# Patient Record
Sex: FEMALE | Race: WHITE | NOT HISPANIC OR LATINO
[De-identification: names, ages, dates, MRNs, and addresses within clinical notes are randomized per-mention and may not be internally consistent; named-entity substitution may affect disease eponyms.]

---

## 2017-02-01 ENCOUNTER — APPOINTMENT (OUTPATIENT)
Dept: ORTHOPEDIC SURGERY | Facility: CLINIC | Age: 72
End: 2017-02-01

## 2017-03-13 ENCOUNTER — RESULT REVIEW (OUTPATIENT)
Age: 72
End: 2017-03-13

## 2017-12-19 ENCOUNTER — APPOINTMENT (OUTPATIENT)
Dept: INTERNAL MEDICINE | Facility: CLINIC | Age: 72
End: 2017-12-19
Payer: COMMERCIAL

## 2017-12-19 ENCOUNTER — NON-APPOINTMENT (OUTPATIENT)
Age: 72
End: 2017-12-19

## 2017-12-19 VITALS — DIASTOLIC BLOOD PRESSURE: 68 MMHG | SYSTOLIC BLOOD PRESSURE: 122 MMHG

## 2017-12-19 DIAGNOSIS — Z23 ENCOUNTER FOR IMMUNIZATION: ICD-10-CM

## 2017-12-19 PROCEDURE — 90688 IIV4 VACCINE SPLT 0.5 ML IM: CPT

## 2017-12-19 PROCEDURE — 99397 PER PM REEVAL EST PAT 65+ YR: CPT | Mod: 25

## 2017-12-19 PROCEDURE — G0008: CPT

## 2017-12-19 PROCEDURE — 93000 ELECTROCARDIOGRAM COMPLETE: CPT

## 2017-12-28 LAB
25(OH)D3 SERPL-MCNC: 34.1 NG/ML
ALBUMIN SERPL ELPH-MCNC: 4.5 G/DL
ALP BLD-CCNC: 63 U/L
ALT SERPL-CCNC: 30 U/L
ANION GAP SERPL CALC-SCNC: 12 MMOL/L
AST SERPL-CCNC: 19 U/L
BASOPHILS # BLD AUTO: 0.04 K/UL
BASOPHILS NFR BLD AUTO: 0.5 %
BILIRUB SERPL-MCNC: 0.7 MG/DL
BUN SERPL-MCNC: 21 MG/DL
CALCIUM SERPL-MCNC: 10.3 MG/DL
CHLORIDE SERPL-SCNC: 103 MMOL/L
CHOLEST SERPL-MCNC: 181 MG/DL
CHOLEST/HDLC SERPL: 2.6 RATIO
CO2 SERPL-SCNC: 28 MMOL/L
CREAT SERPL-MCNC: 0.91 MG/DL
EOSINOPHIL # BLD AUTO: 0.25 K/UL
EOSINOPHIL NFR BLD AUTO: 3.1 %
GLUCOSE SERPL-MCNC: 98 MG/DL
HBA1C MFR BLD HPLC: 5.6 %
HCT VFR BLD CALC: 41.6 %
HDLC SERPL-MCNC: 70 MG/DL
HGB BLD-MCNC: 13.1 G/DL
IMM GRANULOCYTES NFR BLD AUTO: 0.1 %
LDLC SERPL CALC-MCNC: 94 MG/DL
LYMPHOCYTES # BLD AUTO: 2.12 K/UL
LYMPHOCYTES NFR BLD AUTO: 26 %
MAN DIFF?: NORMAL
MCHC RBC-ENTMCNC: 30.5 PG
MCHC RBC-ENTMCNC: 31.5 GM/DL
MCV RBC AUTO: 96.7 FL
MONOCYTES # BLD AUTO: 0.43 K/UL
MONOCYTES NFR BLD AUTO: 5.3 %
NEUTROPHILS # BLD AUTO: 5.31 K/UL
NEUTROPHILS NFR BLD AUTO: 65 %
PLATELET # BLD AUTO: 295 K/UL
POTASSIUM SERPL-SCNC: 4.4 MMOL/L
PROT SERPL-MCNC: 7.3 G/DL
RBC # BLD: 4.3 M/UL
RBC # FLD: 14.5 %
SODIUM SERPL-SCNC: 143 MMOL/L
T4 FREE SERPL-MCNC: 1.1 NG/DL
TRIGL SERPL-MCNC: 84 MG/DL
TSH SERPL-ACNC: 1.93 UIU/ML
WBC # FLD AUTO: 8.16 K/UL

## 2017-12-29 PROBLEM — Z23 NEED FOR INFLUENZA VACCINATION: Status: RESOLVED | Noted: 2017-12-19 | Resolved: 2017-12-29

## 2018-01-10 ENCOUNTER — RESULT REVIEW (OUTPATIENT)
Age: 73
End: 2018-01-10

## 2019-01-08 ENCOUNTER — NON-APPOINTMENT (OUTPATIENT)
Age: 74
End: 2019-01-08

## 2019-01-08 ENCOUNTER — APPOINTMENT (OUTPATIENT)
Dept: INTERNAL MEDICINE | Facility: CLINIC | Age: 74
End: 2019-01-08
Payer: COMMERCIAL

## 2019-01-08 VITALS
SYSTOLIC BLOOD PRESSURE: 128 MMHG | HEART RATE: 68 BPM | WEIGHT: 162 LBS | DIASTOLIC BLOOD PRESSURE: 70 MMHG | HEIGHT: 61 IN | BODY MASS INDEX: 30.58 KG/M2 | OXYGEN SATURATION: 97 %

## 2019-01-08 DIAGNOSIS — H26.9 UNSPECIFIED CATARACT: ICD-10-CM

## 2019-01-08 PROCEDURE — 93000 ELECTROCARDIOGRAM COMPLETE: CPT

## 2019-01-08 PROCEDURE — 99215 OFFICE O/P EST HI 40 MIN: CPT | Mod: 25

## 2019-01-08 NOTE — ASSESSMENT
[High Risk Surgery - Intraperitoneal, Intrathoracic or Supringuinal Vascular Procedures] : High Risk Surgery - Intraperitoneal, Intrathoracic or Supringuinal Vascular Procedures - No (0) [Ischemic Heart Disease] : Ischemic Heart Disease - No (0) [Congestive Heart Failure] : Congestive Heart Failure - No (0) [Prior Cerebrovascular Accident or TIA] : Prior Cerebrovascular Accident or TIA - No (0) [Creatinine >= 2mg/dL (1 Point)] : Creatinine >= 2mg/dL - No (0) [Insulin-dependent Diabetic (1 Point)] : Insulin-dependent Diabetic - No (0) [0] : 0 , RCRI Class: I, Risk of Post-Op Cardiac Complications: 0.4%, Procedure Risk: Low-Risk [Patient Optimized for Surgery] : Patient optimized for surgery [No Further Testing Recommended] : no further testing recommended [Continue medications as is] : Continue current medications

## 2019-01-08 NOTE — HISTORY OF PRESENT ILLNESS
[No Pertinent Cardiac History] : no history of aortic stenosis, atrial fibrillation, coronary artery disease, recent myocardial infarction, or implantable device/pacemaker [No Pertinent Pulmonary History] : no history of asthma, COPD, sleep apnea, or smoking [No Adverse Anesthesia Reaction] : no adverse anesthesia reaction in self or family member [(Patient denies any chest pain, claudication, dyspnea on exertion, orthopnea, palpitations or syncope)] : Patient denies any chest pain, claudication, dyspnea on exertion, orthopnea, palpitations or syncope [____ METs%] : [unfilled] METs% [Prior Anesthesia] : Prior anesthesia [Chronic Anticoagulation] : no chronic anticoagulation [Chronic Kidney Disease] : no chronic kidney disease [Diabetes] : no diabetes [FreeTextEntry1] : Left cataract [FreeTextEntry2] : 1/14/19 [FreeTextEntry3] : Dr. Jm Dow   [phone] [Fever] : no fever [Chills] : no chills [Runny Nose] : no runny nose [Earache] : no earache [Headache] : no headache [Sore Throat] : no sore throat [Cough] : no cough [Appetite] : no decrease in appetite [Nausea] : no nausea [Vomiting] : no vomiting [Abdominal Pain] : no abdominal pain [Diarrhea] : no diarrhea [Easy Bruising] : no easy bruising [Rash] : no rash [Dysuria] : no dysuria [Urinary Frequency] : no urinary frequency [Sleep Apnea] : no sleep apnea [Bleeding Disorder] : no bleeding disorder [Impaired Immunity] : no impaired immunity [Frequent use of NSAIDs] : no use of NSAIDs

## 2019-01-09 LAB
25(OH)D3 SERPL-MCNC: 27.6 NG/ML
ALBUMIN SERPL ELPH-MCNC: 4.4 G/DL
ALP BLD-CCNC: 65 U/L
ALT SERPL-CCNC: 25 U/L
ANION GAP SERPL CALC-SCNC: 11 MMOL/L
AST SERPL-CCNC: 24 U/L
BASOPHILS # BLD AUTO: 0.05 K/UL
BASOPHILS NFR BLD AUTO: 0.7 %
BILIRUB SERPL-MCNC: 0.6 MG/DL
BUN SERPL-MCNC: 12 MG/DL
CALCIUM SERPL-MCNC: 10.1 MG/DL
CHLORIDE SERPL-SCNC: 103 MMOL/L
CHOLEST SERPL-MCNC: 152 MG/DL
CHOLEST/HDLC SERPL: 3 RATIO
CO2 SERPL-SCNC: 27 MMOL/L
CREAT SERPL-MCNC: 0.91 MG/DL
EOSINOPHIL # BLD AUTO: 0.25 K/UL
EOSINOPHIL NFR BLD AUTO: 3.3 %
GLUCOSE SERPL-MCNC: 87 MG/DL
HBA1C MFR BLD HPLC: 5.6 %
HCT VFR BLD CALC: 43.6 %
HDLC SERPL-MCNC: 51 MG/DL
HGB BLD-MCNC: 13.8 G/DL
IMM GRANULOCYTES NFR BLD AUTO: 0 %
LDLC SERPL CALC-MCNC: 85 MG/DL
LYMPHOCYTES # BLD AUTO: 2.18 K/UL
LYMPHOCYTES NFR BLD AUTO: 28.7 %
MAN DIFF?: NORMAL
MCHC RBC-ENTMCNC: 29.4 PG
MCHC RBC-ENTMCNC: 31.7 GM/DL
MCV RBC AUTO: 93 FL
MONOCYTES # BLD AUTO: 0.45 K/UL
MONOCYTES NFR BLD AUTO: 5.9 %
NEUTROPHILS # BLD AUTO: 4.66 K/UL
NEUTROPHILS NFR BLD AUTO: 61.4 %
PLATELET # BLD AUTO: 368 K/UL
POTASSIUM SERPL-SCNC: 4.6 MMOL/L
PROT SERPL-MCNC: 7.6 G/DL
RBC # BLD: 4.69 M/UL
RBC # FLD: 14.1 %
SODIUM SERPL-SCNC: 141 MMOL/L
T4 FREE SERPL-MCNC: 1.1 NG/DL
TRIGL SERPL-MCNC: 82 MG/DL
TSH SERPL-ACNC: 2.34 UIU/ML
WBC # FLD AUTO: 7.59 K/UL

## 2019-01-17 ENCOUNTER — RESULT REVIEW (OUTPATIENT)
Age: 74
End: 2019-01-17

## 2019-03-13 ENCOUNTER — TRANSCRIPTION ENCOUNTER (OUTPATIENT)
Age: 74
End: 2019-03-13

## 2019-03-15 ENCOUNTER — APPOINTMENT (OUTPATIENT)
Dept: INTERNAL MEDICINE | Facility: CLINIC | Age: 74
End: 2019-03-15

## 2020-03-17 ENCOUNTER — APPOINTMENT (OUTPATIENT)
Dept: INTERNAL MEDICINE | Facility: CLINIC | Age: 75
End: 2020-03-17

## 2020-10-30 ENCOUNTER — NON-APPOINTMENT (OUTPATIENT)
Age: 75
End: 2020-10-30

## 2020-11-02 ENCOUNTER — APPOINTMENT (OUTPATIENT)
Dept: INTERNAL MEDICINE | Facility: CLINIC | Age: 75
End: 2020-11-02
Payer: COMMERCIAL

## 2020-11-02 ENCOUNTER — NON-APPOINTMENT (OUTPATIENT)
Age: 75
End: 2020-11-02

## 2020-11-02 VITALS
SYSTOLIC BLOOD PRESSURE: 138 MMHG | WEIGHT: 156 LBS | DIASTOLIC BLOOD PRESSURE: 70 MMHG | HEIGHT: 60 IN | BODY MASS INDEX: 30.63 KG/M2

## 2020-11-02 PROCEDURE — 99072 ADDL SUPL MATRL&STAF TM PHE: CPT

## 2020-11-02 PROCEDURE — 99397 PER PM REEVAL EST PAT 65+ YR: CPT | Mod: 25

## 2020-11-02 PROCEDURE — 93000 ELECTROCARDIOGRAM COMPLETE: CPT | Mod: 59

## 2020-11-02 PROCEDURE — G0444 DEPRESSION SCREEN ANNUAL: CPT | Mod: NC,59

## 2020-11-02 NOTE — HISTORY OF PRESENT ILLNESS
[de-identified] : 75-year-old female with a history of hyperlipidemia, prediabetes, osteoporosis off a bisphosphonate for a number of years and a sessile colonic polyp removed by laser in 2017 that was 4.5 cm here for comprehensive evaluation.  She has no specific complaints at this time and has been feeling well.  Had a bone density in October that revealed osteopenia that was a little worse than prior.  She is restarted vitamin D3 1000 units daily but is not taking any calcium supplements.\par \par She is wondering if she may have had Covid as she was in [place] in December and came back and developed a severe cough.  1 other member of her family was sick as well.

## 2020-11-02 NOTE — COUNSELING
[Fall prevention counseling provided] : Fall prevention counseling provided [No throw rugs] : No throw rugs [Good understanding] : Patient has a good understanding of disease, goals and obesity follow-up plan [None] : None

## 2020-11-02 NOTE — ASSESSMENT
[FreeTextEntry1] : 1.  General health maintenance -mammogram is up to date.  Follows with GYN and reports Pap up-to-date.  Had a bone density last month that showed osteopenia with slight increased risk of fracture for both hips.  Is currently taking vitamin D but no calcium.  Suggested she start taking 1 Tums daily.  Had a flu shot last month.  Recommended Shingrix at the pharmacy.\par 2.  History of large polyp removed by laser.  Had a follow-up colonoscopy 1 year later and it is approximately now 3-1/2 years after the initial removal.  She is due for a colonoscopy in this window and recommended that she schedule I with her gastroenterologist.\par 3.  Hyperlipidemia tolerating a statin.  We will check a lipid profile today.\par 4.  Prediabetes -no signs or symptoms of overt diabetes.  Will check a hemoglobin A1c.\par 5.  Will check Covid antibodies to rule out any exposure.\par 6.  Follow-up in 1 year or as needed.

## 2020-11-02 NOTE — HEALTH RISK ASSESSMENT
[Very Good] : ~his/her~  mood as very good [] : No [Yes] : Yes [No] : In the past 12 months have you used drugs other than those required for medical reasons? No [No falls in past year] : Patient reported no falls in the past year [0] : 2) Feeling down, depressed, or hopeless: Not at all (0) [de-identified] : Occasional [de-identified] : [place] in her neighborhood. [[id number]] : 0 [Patient reported mammogram was normal] : Patient reported mammogram was normal [Patient reported bone density results were abnormal] : Patient reported bone density results were abnormal [Patient reported colonoscopy was normal] : Patient reported colonoscopy was normal [Change in mental status noted] : No change in mental status noted [Language] : denies difficulty with language [Behavior] : denies difficulty with behavior [Learning/Retaining New Information] : denies difficulty learning/retaining new information [Handling Complex Tasks] : denies difficulty handling complex tasks [Reasoning] : denies difficulty with reasoning [Spatial Ability and Orientation] : denies difficulty with spatial ability and orientation [None] : None [With Significant Other] : lives with significant other [] :  [Fully functional (bathing, dressing, toileting, transferring, walking, feeding)] : Fully functional (bathing, dressing, toileting, transferring, walking, feeding) [Fully functional (using the telephone, shopping, preparing meals, housekeeping, doing laundry, using] : Fully functional and needs no help or supervision to perform IADLs (using the telephone, shopping, preparing meals, housekeeping, doing laundry, using transportation, managing medications and managing finances) [Reports changes in hearing] : Reports no changes in hearing [Reports changes in vision] : Reports no changes in vision [MammogramDate] : 10/20 [BoneDensityDate] : 10/20 [BoneDensityComments] : Osteopenia [ColonoscopyDate] : 04/17 [ColonoscopyComments] : 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

## 2020-11-02 NOTE — PHYSICAL EXAM
[No Acute Distress] : no acute distress [Well Nourished] : well nourished [Well Developed] : well developed [Well-Appearing] : well-appearing [Normal Sclera/Conjunctiva] : normal sclera/conjunctiva [PERRL] : pupils equal round and reactive to light [EOMI] : extraocular movements intact [Normal Outer Ear/Nose] : the outer ears and nose were normal in appearance [No JVD] : no jugular venous distention [No Lymphadenopathy] : no lymphadenopathy [Supple] : supple [Thyroid Normal, No Nodules] : the thyroid was normal and there were no nodules present [No Respiratory Distress] : no respiratory distress  [No Accessory Muscle Use] : no accessory muscle use [Clear to Auscultation] : lungs were clear to auscultation bilaterally [Normal Rate] : normal rate  [Regular Rhythm] : with a regular rhythm [Normal S1, S2] : normal S1 and S2 [No Murmur] : no murmur heard [No Carotid Bruits] : no carotid bruits [No Abdominal Bruit] : a ~M bruit was not heard ~T in the abdomen [No Varicosities] : no varicosities [Pedal Pulses Present] : the pedal pulses are present [No Edema] : there was no peripheral edema [No Palpable Aorta] : no palpable aorta [No Extremity Clubbing/Cyanosis] : no extremity clubbing/cyanosis [Normal Appearance] : normal in appearance [No Nipple Discharge] : no nipple discharge [Soft] : abdomen soft [Non Tender] : non-tender [Non-distended] : non-distended [No Masses] : no abdominal mass palpated [No HSM] : no HSM [Normal Bowel Sounds] : normal bowel sounds [Normal Posterior Cervical Nodes] : no posterior cervical lymphadenopathy [Normal Anterior Cervical Nodes] : no anterior cervical lymphadenopathy [No CVA Tenderness] : no CVA  tenderness [No Spinal Tenderness] : no spinal tenderness [No Joint Swelling] : no joint swelling [Grossly Normal Strength/Tone] : grossly normal strength/tone [No Rash] : no rash [Coordination Grossly Intact] : coordination grossly intact [No Focal Deficits] : no focal deficits [Normal Gait] : normal gait [Normal Affect] : the affect was normal [Normal Insight/Judgement] : insight and judgment were intact

## 2020-11-04 LAB
25(OH)D3 SERPL-MCNC: 32.3 NG/ML
ALBUMIN SERPL ELPH-MCNC: 4.7 G/DL
ALP BLD-CCNC: 66 U/L
ALT SERPL-CCNC: 24 U/L
ANION GAP SERPL CALC-SCNC: 10 MMOL/L
AST SERPL-CCNC: 21 U/L
BASOPHILS # BLD AUTO: 0.07 K/UL
BASOPHILS NFR BLD AUTO: 0.9 %
BILIRUB SERPL-MCNC: 0.8 MG/DL
BUN SERPL-MCNC: 14 MG/DL
CALCIUM SERPL-MCNC: 10.5 MG/DL
CHLORIDE SERPL-SCNC: 101 MMOL/L
CHOLEST SERPL-MCNC: 186 MG/DL
CO2 SERPL-SCNC: 30 MMOL/L
CREAT SERPL-MCNC: 0.94 MG/DL
EOSINOPHIL # BLD AUTO: 0.17 K/UL
EOSINOPHIL NFR BLD AUTO: 2.3 %
ESTIMATED AVERAGE GLUCOSE: 120 MG/DL
GLUCOSE SERPL-MCNC: 102 MG/DL
HBA1C MFR BLD HPLC: 5.8 %
HCT VFR BLD CALC: 46 %
HDLC SERPL-MCNC: 67 MG/DL
HGB BLD-MCNC: 14.6 G/DL
IMM GRANULOCYTES NFR BLD AUTO: 0 %
LDLC SERPL CALC-MCNC: 99 MG/DL
LYMPHOCYTES # BLD AUTO: 1.82 K/UL
LYMPHOCYTES NFR BLD AUTO: 24.2 %
MAN DIFF?: NORMAL
MCHC RBC-ENTMCNC: 30 PG
MCHC RBC-ENTMCNC: 31.7 GM/DL
MCV RBC AUTO: 94.5 FL
MONOCYTES # BLD AUTO: 0.44 K/UL
MONOCYTES NFR BLD AUTO: 5.9 %
NEUTROPHILS # BLD AUTO: 5.02 K/UL
NEUTROPHILS NFR BLD AUTO: 66.7 %
NONHDLC SERPL-MCNC: 120 MG/DL
PLATELET # BLD AUTO: 258 K/UL
POTASSIUM SERPL-SCNC: 5 MMOL/L
PROT SERPL-MCNC: 7.1 G/DL
RBC # BLD: 4.87 M/UL
RBC # FLD: 13.5 %
SODIUM SERPL-SCNC: 142 MMOL/L
T4 FREE SERPL-MCNC: 1.1 NG/DL
TRIGL SERPL-MCNC: 104 MG/DL
TSH SERPL-ACNC: 2.62 UIU/ML
WBC # FLD AUTO: 7.52 K/UL

## 2021-02-24 ENCOUNTER — RX RENEWAL (OUTPATIENT)
Age: 76
End: 2021-02-24

## 2021-06-22 ENCOUNTER — TRANSCRIPTION ENCOUNTER (OUTPATIENT)
Age: 76
End: 2021-06-22

## 2021-11-16 ENCOUNTER — NON-APPOINTMENT (OUTPATIENT)
Age: 76
End: 2021-11-16

## 2021-11-17 ENCOUNTER — NON-APPOINTMENT (OUTPATIENT)
Age: 76
End: 2021-11-17

## 2021-11-17 ENCOUNTER — APPOINTMENT (OUTPATIENT)
Dept: INTERNAL MEDICINE | Facility: CLINIC | Age: 76
End: 2021-11-17
Payer: COMMERCIAL

## 2021-11-17 VITALS
RESPIRATION RATE: 14 BRPM | HEIGHT: 60 IN | BODY MASS INDEX: 30.63 KG/M2 | SYSTOLIC BLOOD PRESSURE: 138 MMHG | WEIGHT: 156 LBS | DIASTOLIC BLOOD PRESSURE: 80 MMHG | HEART RATE: 72 BPM | OXYGEN SATURATION: 95 %

## 2021-11-17 DIAGNOSIS — Z01.818 ENCOUNTER FOR OTHER PREPROCEDURAL EXAMINATION: ICD-10-CM

## 2021-11-17 DIAGNOSIS — K63.5 POLYP OF COLON: ICD-10-CM

## 2021-11-17 DIAGNOSIS — E55.9 VITAMIN D DEFICIENCY, UNSPECIFIED: ICD-10-CM

## 2021-11-17 PROCEDURE — G0444 DEPRESSION SCREEN ANNUAL: CPT | Mod: NC,59

## 2021-11-17 PROCEDURE — 93000 ELECTROCARDIOGRAM COMPLETE: CPT | Mod: 59

## 2021-11-17 PROCEDURE — 99397 PER PM REEVAL EST PAT 65+ YR: CPT | Mod: 25

## 2021-11-17 NOTE — COUNSELING
[No throw rugs] : No throw rugs [Good understanding] : Patient has a good understanding of disease, goals and obesity follow-up plan [None] : None

## 2021-11-17 NOTE — ASSESSMENT
[FreeTextEntry1] : 1.  General health maintenance -mammogram is up to date.  Follows with GYN and reports Pap up-to-date.  Had a bone density last year that showed osteopenia with slight increased risk of fracture for both hips.   Had a flu shot in September and had her COVID series and booster.  Recommended Shingrix at the pharmacy.  Uses sunscreen, seatbelts etc.  Depression screening - a PHQ2 was administered to the patient and reviewed at the time of the visit.  The PHQ2 was negative and no further action is required.  Will rescreen in 1 year or prn.\par 2.  History of large colonic polyp removed by laser.  Had a follow-up colonoscopy 1 year later and one this past April which she reports was fine.  Repeat is due in 3 years.\par 3.  Hyperlipidemia tolerating a statin.  We will check a lipid profile today.\par 4.  Prediabetes -no signs or symptoms of overt diabetes.  Will check a hemoglobin A1c.\par 5.  Dupytrens contracture - continue with stretching exercises.\par 6.  RTO 1 year or prn.

## 2021-11-17 NOTE — HISTORY OF PRESENT ILLNESS
[de-identified] : 76-year-old female with a history of hyperlipidemia, prediabetes, osteoporosis off a bisphosphonate for a number of years and a sessile colonic polyp removed by laser in 2017 that was 4.5 cm here for comprehensive evaluation.  She has no specific complaints at this time and has been feeling well.  Had a bone density in October, 20 that revealed osteopenia that was a little worse than prior.  She is restarted vitamin D3 1000 units daily but is not taking any calcium supplements.\par \par She did have a trip but not a fall over a speed bump bruising her Achilles region which took a number of weeks to heal.  Had a colonoscopy in April to follow-up on her large sessile polyp that was removed in 2017 and had no polyps removed at this time.  Had her flu shot in September and her Covid booster in October.  Saw a hand surgeon regarding Dupuytren's contractures which he feels is not bad enough to warrant surgery at this time.

## 2021-11-17 NOTE — HEALTH RISK ASSESSMENT
[Very Good] : ~his/her~  mood as very good [Yes] : Yes [No] : In the past 12 months have you used drugs other than those required for medical reasons? No [No falls in past year] : Patient reported no falls in the past year [0] : 2) Feeling down, depressed, or hopeless: Not at all (0) [Patient reported mammogram was normal] : Patient reported mammogram was normal [Patient reported bone density results were abnormal] : Patient reported bone density results were abnormal [Patient reported colonoscopy was normal] : Patient reported colonoscopy was normal [None] : None [With Significant Other] : lives with significant other [] :  [Fully functional (bathing, dressing, toileting, transferring, walking, feeding)] : Fully functional (bathing, dressing, toileting, transferring, walking, feeding) [Fully functional (using the telephone, shopping, preparing meals, housekeeping, doing laundry, using] : Fully functional and needs no help or supervision to perform IADLs (using the telephone, shopping, preparing meals, housekeeping, doing laundry, using transportation, managing medications and managing finances) [] : No [PHQ-2 Negative - No further assessment needed] : PHQ-2 Negative - No further assessment needed [de-identified] : Occasional [de-identified] : [place]ks in her neighborhood. [[id number]] : 0 [Change in mental status noted] : No change in mental status noted [Language] : denies difficulty with language [Behavior] : denies difficulty with behavior [Learning/Retaining New Information] : denies difficulty learning/retaining new information [Handling Complex Tasks] : denies difficulty handling complex tasks [Reasoning] : denies difficulty with reasoning [Spatial Ability and Orientation] : denies difficulty with spatial ability and orientation [Reports changes in hearing] : Reports no changes in hearing [Reports changes in vision] : Reports no changes in vision [MammogramDate] : 10/21 [PapSmearComments] : Still goes for Paps, managed through GYN [BoneDensityDate] : 10/20 [BoneDensityComments] : Osteopenia [ColonoscopyDate] : 04/21 [ColonoscopyComments] : 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

## 2021-11-17 NOTE — PHYSICAL EXAM
[Well Nourished] : well nourished [Well Developed] : well developed [Well-Appearing] : well-appearing [Normal Sclera/Conjunctiva] : normal sclera/conjunctiva [PERRL] : pupils equal round and reactive to light [EOMI] : extraocular movements intact [Normal Outer Ear/Nose] : the outer ears and nose were normal in appearance [No JVD] : no jugular venous distention [No Lymphadenopathy] : no lymphadenopathy [Supple] : supple [Thyroid Normal, No Nodules] : the thyroid was normal and there were no nodules present [No Respiratory Distress] : no respiratory distress  [No Accessory Muscle Use] : no accessory muscle use [Clear to Auscultation] : lungs were clear to auscultation bilaterally [Normal Rate] : normal rate  [Regular Rhythm] : with a regular rhythm [Normal S1, S2] : normal S1 and S2 [No Murmur] : no murmur heard [No Carotid Bruits] : no carotid bruits [No Abdominal Bruit] : a ~M bruit was not heard ~T in the abdomen [No Varicosities] : no varicosities [Pedal Pulses Present] : the pedal pulses are present [No Edema] : there was no peripheral edema [No Palpable Aorta] : no palpable aorta [No Extremity Clubbing/Cyanosis] : no extremity clubbing/cyanosis [Normal Appearance] : normal in appearance [No Nipple Discharge] : no nipple discharge [Soft] : abdomen soft [Non Tender] : non-tender [Non-distended] : non-distended [No Masses] : no abdominal mass palpated [No HSM] : no HSM [Normal Bowel Sounds] : normal bowel sounds [Normal Posterior Cervical Nodes] : no posterior cervical lymphadenopathy [Normal Anterior Cervical Nodes] : no anterior cervical lymphadenopathy [No CVA Tenderness] : no CVA  tenderness [No Spinal Tenderness] : no spinal tenderness [No Joint Swelling] : no joint swelling [Grossly Normal Strength/Tone] : grossly normal strength/tone [No Rash] : no rash [Coordination Grossly Intact] : coordination grossly intact [No Focal Deficits] : no focal deficits [Normal Gait] : normal gait [Normal Affect] : the affect was normal [Normal Insight/Judgement] : insight and judgment were intact

## 2021-12-13 LAB
25(OH)D3 SERPL-MCNC: 40 NG/ML
ALBUMIN SERPL ELPH-MCNC: 4.5 G/DL
ALP BLD-CCNC: 67 U/L
ALT SERPL-CCNC: 24 U/L
ANION GAP SERPL CALC-SCNC: 11 MMOL/L
AST SERPL-CCNC: 20 U/L
BASOPHILS # BLD AUTO: 0.06 K/UL
BASOPHILS NFR BLD AUTO: 0.7 %
BILIRUB SERPL-MCNC: 0.5 MG/DL
BUN SERPL-MCNC: 17 MG/DL
CALCIUM SERPL-MCNC: 10.2 MG/DL
CHLORIDE SERPL-SCNC: 106 MMOL/L
CHOLEST SERPL-MCNC: 170 MG/DL
CO2 SERPL-SCNC: 26 MMOL/L
CREAT SERPL-MCNC: 0.96 MG/DL
EOSINOPHIL # BLD AUTO: 0.27 K/UL
EOSINOPHIL NFR BLD AUTO: 3.2 %
ESTIMATED AVERAGE GLUCOSE: 114 MG/DL
GLUCOSE SERPL-MCNC: 102 MG/DL
HBA1C MFR BLD HPLC: 5.6 %
HCT VFR BLD CALC: 40.5 %
HDLC SERPL-MCNC: 69 MG/DL
HGB BLD-MCNC: 12.8 G/DL
IMM GRANULOCYTES NFR BLD AUTO: 0.4 %
LDLC SERPL CALC-MCNC: 86 MG/DL
LYMPHOCYTES # BLD AUTO: 1.94 K/UL
LYMPHOCYTES NFR BLD AUTO: 22.7 %
MAN DIFF?: NORMAL
MCHC RBC-ENTMCNC: 30.5 PG
MCHC RBC-ENTMCNC: 31.6 GM/DL
MCV RBC AUTO: 96.7 FL
MONOCYTES # BLD AUTO: 0.48 K/UL
MONOCYTES NFR BLD AUTO: 5.6 %
NEUTROPHILS # BLD AUTO: 5.78 K/UL
NEUTROPHILS NFR BLD AUTO: 67.4 %
NONHDLC SERPL-MCNC: 101 MG/DL
PLATELET # BLD AUTO: 304 K/UL
POTASSIUM SERPL-SCNC: 4.7 MMOL/L
PROT SERPL-MCNC: 6.9 G/DL
RBC # BLD: 4.19 M/UL
RBC # FLD: 14.2 %
SODIUM SERPL-SCNC: 144 MMOL/L
T4 FREE SERPL-MCNC: 1.1 NG/DL
TRIGL SERPL-MCNC: 75 MG/DL
TSH SERPL-ACNC: 2.24 UIU/ML
WBC # FLD AUTO: 8.56 K/UL

## 2022-12-06 ENCOUNTER — APPOINTMENT (OUTPATIENT)
Dept: INTERNAL MEDICINE | Facility: CLINIC | Age: 77
End: 2022-12-06
Payer: COMMERCIAL

## 2022-12-06 ENCOUNTER — NON-APPOINTMENT (OUTPATIENT)
Age: 77
End: 2022-12-06

## 2022-12-06 VITALS
HEART RATE: 71 BPM | HEIGHT: 61 IN | SYSTOLIC BLOOD PRESSURE: 120 MMHG | BODY MASS INDEX: 29.83 KG/M2 | WEIGHT: 158 LBS | DIASTOLIC BLOOD PRESSURE: 68 MMHG | OXYGEN SATURATION: 94 %

## 2022-12-06 DIAGNOSIS — Z87.39 PERSONAL HISTORY OF OTHER DISEASES OF THE MUSCULOSKELETAL SYSTEM AND CONNECTIVE TISSUE: ICD-10-CM

## 2022-12-06 DIAGNOSIS — Z80.3 FAMILY HISTORY OF MALIGNANT NEOPLASM OF BREAST: ICD-10-CM

## 2022-12-06 DIAGNOSIS — R73.03 PREDIABETES.: ICD-10-CM

## 2022-12-06 DIAGNOSIS — Z13.39 ENCOUNTER FOR SCREENING EXAM FOR OTHER MENTAL HEALTH AND BEHAVIORAL DISORDERS: ICD-10-CM

## 2022-12-06 DIAGNOSIS — E78.5 HYPERLIPIDEMIA, UNSPECIFIED: ICD-10-CM

## 2022-12-06 DIAGNOSIS — Z80.0 FAMILY HISTORY OF MALIGNANT NEOPLASM OF DIGESTIVE ORGANS: ICD-10-CM

## 2022-12-06 DIAGNOSIS — Z13.31 ENCOUNTER FOR SCREENING FOR DEPRESSION: ICD-10-CM

## 2022-12-06 PROCEDURE — 99397 PER PM REEVAL EST PAT 65+ YR: CPT | Mod: 25

## 2022-12-06 PROCEDURE — G0444 DEPRESSION SCREEN ANNUAL: CPT | Mod: NC,59

## 2022-12-06 PROCEDURE — 93000 ELECTROCARDIOGRAM COMPLETE: CPT | Mod: 59

## 2022-12-06 PROCEDURE — G0442 ANNUAL ALCOHOL SCREEN 15 MIN: CPT | Mod: NC

## 2023-01-29 PROBLEM — Z13.39 SCREENING FOR ALCOHOL PROBLEM: Status: RESOLVED | Noted: 2023-01-29 | Resolved: 2023-01-31

## 2023-01-29 PROBLEM — Z80.0 FAMILY HISTORY OF COLON CANCER: Status: ACTIVE | Noted: 2023-01-29

## 2023-01-29 PROBLEM — Z13.31 SCREENING FOR DEPRESSION: Status: RESOLVED | Noted: 2020-11-02 | Resolved: 2020-11-04

## 2023-01-29 PROBLEM — Z80.3 FAMILY HISTORY OF BREAST CANCER: Status: ACTIVE | Noted: 2023-01-29

## 2023-01-29 LAB
25(OH)D3 SERPL-MCNC: 36.4 NG/ML
ALBUMIN SERPL ELPH-MCNC: 4.2 G/DL
ALP BLD-CCNC: 75 U/L
ALT SERPL-CCNC: 36 U/L
ANION GAP SERPL CALC-SCNC: 9 MMOL/L
AST SERPL-CCNC: 26 U/L
BASOPHILS # BLD AUTO: 0.08 K/UL
BASOPHILS NFR BLD AUTO: 1 %
BILIRUB SERPL-MCNC: 0.7 MG/DL
BUN SERPL-MCNC: 17 MG/DL
CALCIUM SERPL-MCNC: 10.2 MG/DL
CHLORIDE SERPL-SCNC: 103 MMOL/L
CHOLEST SERPL-MCNC: 171 MG/DL
CO2 SERPL-SCNC: 29 MMOL/L
CREAT SERPL-MCNC: 1 MG/DL
EGFR: 58 ML/MIN/1.73M2
EOSINOPHIL # BLD AUTO: 0.17 K/UL
EOSINOPHIL NFR BLD AUTO: 2.2 %
ESTIMATED AVERAGE GLUCOSE: 117 MG/DL
GLUCOSE SERPL-MCNC: 97 MG/DL
HBA1C MFR BLD HPLC: 5.7 %
HCT VFR BLD CALC: 44.6 %
HDLC SERPL-MCNC: 65 MG/DL
HGB BLD-MCNC: 13.9 G/DL
IMM GRANULOCYTES NFR BLD AUTO: 0.1 %
LDLC SERPL CALC-MCNC: 85 MG/DL
LYMPHOCYTES # BLD AUTO: 2.07 K/UL
LYMPHOCYTES NFR BLD AUTO: 27 %
MAN DIFF?: NORMAL
MCHC RBC-ENTMCNC: 30 PG
MCHC RBC-ENTMCNC: 31.2 GM/DL
MCV RBC AUTO: 96.1 FL
MONOCYTES # BLD AUTO: 0.48 K/UL
MONOCYTES NFR BLD AUTO: 6.3 %
NEUTROPHILS # BLD AUTO: 4.86 K/UL
NEUTROPHILS NFR BLD AUTO: 63.4 %
NONHDLC SERPL-MCNC: 106 MG/DL
PLATELET # BLD AUTO: 287 K/UL
POTASSIUM SERPL-SCNC: 4.8 MMOL/L
PROT SERPL-MCNC: 6.8 G/DL
RBC # BLD: 4.64 M/UL
RBC # FLD: 13.9 %
SODIUM SERPL-SCNC: 141 MMOL/L
T4 FREE SERPL-MCNC: 1.1 NG/DL
TRIGL SERPL-MCNC: 104 MG/DL
TSH SERPL-ACNC: 2.36 UIU/ML
WBC # FLD AUTO: 7.67 K/UL

## 2023-01-29 NOTE — HEALTH RISK ASSESSMENT
[Very Good] : ~his/her~  mood as very good [Never] : Never [Yes] : Yes [No] : In the past 12 months have you used drugs other than those required for medical reasons? No [No falls in past year] : Patient reported no falls in the past year [0] : 2) Feeling down, depressed, or hopeless: Not at all (0) [PHQ-2 Negative - No further assessment needed] : PHQ-2 Negative - No further assessment needed [Patient reported mammogram was normal] : Patient reported mammogram was normal [Patient reported bone density results were abnormal] : Patient reported bone density results were abnormal [Patient reported colonoscopy was normal] : Patient reported colonoscopy was normal [None] : None [With Significant Other] : lives with significant other [] :  [Fully functional (bathing, dressing, toileting, transferring, walking, feeding)] : Fully functional (bathing, dressing, toileting, transferring, walking, feeding) [Fully functional (using the telephone, shopping, preparing meals, housekeeping, doing laundry, using] : Fully functional and needs no help or supervision to perform IADLs (using the telephone, shopping, preparing meals, housekeeping, doing laundry, using transportation, managing medications and managing finances) [With Patient/Caregiver] : , with patient/caregiver [Designated Healthcare Proxy] : Designated healthcare proxy [Name: ___] : Health Care Proxy's Name: [unfilled]  [Relationship: ___] : Relationship: [unfilled] [de-identified] : Walks in her neighborhood. [de-identified] : Occasional [[id number]] : 0 [Change in mental status noted] : No change in mental status noted [Behavior] : denies difficulty with behavior [Language] : denies difficulty with language [Learning/Retaining New Information] : denies difficulty learning/retaining new information [Handling Complex Tasks] : denies difficulty handling complex tasks [Reasoning] : denies difficulty with reasoning [Spatial Ability and Orientation] : denies difficulty with spatial ability and orientation [Reports changes in hearing] : Reports no changes in hearing [Reports changes in vision] : Reports no changes in vision [MammogramDate] : 09/21 [BoneDensityDate] : 10/20 [PapSmearComments] : Still goes for Paps, managed through GYN [BoneDensityComments] : Osteopenia [ColonoscopyDate] : 04/21 [ColonoscopyComments] : 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

## 2023-01-29 NOTE — HISTORY OF PRESENT ILLNESS
[de-identified] : 77-year-old female with a history of hyperlipidemia, prediabetes, osteoporosis off a bisphosphonate for a number of years and a sessile colonic polyp removed by laser in 2017 that was 4.5 cm here for comprehensive evaluation.  She has no specific complaints at this time and has been feeling well.  Had a bone density in October, 20 that revealed osteopenia that was a little worse than prior.  She is restarted vitamin D3 1000 units daily but is not taking any calcium supplements.  She is scheduled next week for a repeat.\par \par In general, she has been feeling well.

## 2023-01-29 NOTE — ASSESSMENT
[FreeTextEntry1] : 1.  General health maintenance -mammogram is up to date.  Follows with GYN and reports Pap up-to-date.  Had a bone density in 020 that showed osteopenia with slight increased risk of fracture for both hips.   Had a flu shot in September and had her COVID series and booster.  Recommended Shingrix at the pharmacy.  Uses sunscreen, seatbelts etc.  Depression screening - a PHQ2 was administered to the patient and reviewed at the time of the visit.  The PHQ2 was negative and no further action is required.  Will rescreen in 1 year or prn.\par 2.  History of large colonic polyp removed by laser.  Had a follow-up colonoscopy 1 year later and one in  April, 21 which she reports was fine.  Repeat is due in 3 years.\par 3.  Hyperlipidemia tolerating a statin.  We will check a lipid profile today.\par 4.  Prediabetes -no signs or symptoms of overt diabetes.  Will check a hemoglobin A1c.\par 5.  Dupytrens contracture - continue with stretching exercises.\par 6.  RTO 1 year or prn.

## 2023-06-01 ENCOUNTER — APPOINTMENT (OUTPATIENT)
Dept: SPINE | Facility: CLINIC | Age: 78
End: 2023-06-01

## 2024-04-03 ENCOUNTER — OUTPATIENT (OUTPATIENT)
Dept: OUTPATIENT SERVICES | Facility: HOSPITAL | Age: 79
LOS: 1 days | End: 2024-04-03
Payer: COMMERCIAL

## 2024-04-03 ENCOUNTER — RESULT REVIEW (OUTPATIENT)
Age: 79
End: 2024-04-03

## 2024-04-03 ENCOUNTER — APPOINTMENT (OUTPATIENT)
Dept: INTERNAL MEDICINE | Facility: CLINIC | Age: 79
End: 2024-04-03
Payer: MEDICARE

## 2024-04-03 VITALS
OXYGEN SATURATION: 98 % | DIASTOLIC BLOOD PRESSURE: 70 MMHG | BODY MASS INDEX: 29.07 KG/M2 | HEIGHT: 61 IN | WEIGHT: 154 LBS | HEART RATE: 75 BPM | SYSTOLIC BLOOD PRESSURE: 130 MMHG

## 2024-04-03 DIAGNOSIS — I10 ESSENTIAL (PRIMARY) HYPERTENSION: ICD-10-CM

## 2024-04-03 DIAGNOSIS — Z00.00 ENCOUNTER FOR GENERAL ADULT MEDICAL EXAMINATION W/OUT ABNORMAL FINDINGS: ICD-10-CM

## 2024-04-03 DIAGNOSIS — M81.0 AGE-RELATED OSTEOPOROSIS W/OUT CURRENT PATHOLOGICAL FRACTURE: ICD-10-CM

## 2024-04-03 DIAGNOSIS — M81.0 AGE-RELATED OSTEOPOROSIS WITHOUT CURRENT PATHOLOGICAL FRACTURE: ICD-10-CM

## 2024-04-03 DIAGNOSIS — Z13.31 ENCOUNTER FOR SCREENING FOR DEPRESSION: ICD-10-CM

## 2024-04-03 DIAGNOSIS — M79.18 MYALGIA, OTHER SITE: ICD-10-CM

## 2024-04-03 DIAGNOSIS — R06.2 WHEEZING: ICD-10-CM

## 2024-04-03 DIAGNOSIS — S39.012A STRAIN OF MUSCLE, FASCIA AND TENDON OF LOWER BACK, INITIAL ENCOUNTER: ICD-10-CM

## 2024-04-03 DIAGNOSIS — Z00.00 ENCOUNTER FOR GENERAL ADULT MEDICAL EXAMINATION WITHOUT ABNORMAL FINDINGS: ICD-10-CM

## 2024-04-03 DIAGNOSIS — Z23 ENCOUNTER FOR IMMUNIZATION: ICD-10-CM

## 2024-04-03 PROCEDURE — 90677 PCV20 VACCINE IM: CPT

## 2024-04-03 PROCEDURE — G0402 INITIAL PREVENTIVE EXAM: CPT

## 2024-04-03 PROCEDURE — G0009: CPT

## 2024-04-03 PROCEDURE — G0439: CPT

## 2024-04-03 PROCEDURE — G0444 DEPRESSION SCREEN ANNUAL: CPT

## 2024-04-03 NOTE — PHYSICAL EXAM
[Well Nourished] : well nourished [Well Developed] : well developed [Well-Appearing] : well-appearing [Normal Sclera/Conjunctiva] : normal sclera/conjunctiva [PERRL] : pupils equal round and reactive to light [EOMI] : extraocular movements intact [Normal Outer Ear/Nose] : the outer ears and nose were normal in appearance [No JVD] : no jugular venous distention [No Lymphadenopathy] : no lymphadenopathy [Supple] : supple [No Respiratory Distress] : no respiratory distress  [Thyroid Normal, No Nodules] : the thyroid was normal and there were no nodules present [No Accessory Muscle Use] : no accessory muscle use [Clear to Auscultation] : lungs were clear to auscultation bilaterally [Normal Rate] : normal rate  [Regular Rhythm] : with a regular rhythm [Normal S1, S2] : normal S1 and S2 [No Murmur] : no murmur heard [No Carotid Bruits] : no carotid bruits [No Abdominal Bruit] : a ~M bruit was not heard ~T in the abdomen [No Varicosities] : no varicosities [Pedal Pulses Present] : the pedal pulses are present [No Edema] : there was no peripheral edema [No Extremity Clubbing/Cyanosis] : no extremity clubbing/cyanosis [No Palpable Aorta] : no palpable aorta [Normal Appearance] : normal in appearance [Soft] : abdomen soft [No Nipple Discharge] : no nipple discharge [Non Tender] : non-tender [Non-distended] : non-distended [No Masses] : no abdominal mass palpated [No HSM] : no HSM [Normal Bowel Sounds] : normal bowel sounds [Normal Posterior Cervical Nodes] : no posterior cervical lymphadenopathy [Normal Anterior Cervical Nodes] : no anterior cervical lymphadenopathy [No CVA Tenderness] : no CVA  tenderness [No Spinal Tenderness] : no spinal tenderness [No Joint Swelling] : no joint swelling [Grossly Normal Strength/Tone] : grossly normal strength/tone [No Rash] : no rash [Coordination Grossly Intact] : coordination grossly intact [No Focal Deficits] : no focal deficits [Normal Gait] : normal gait [Normal Affect] : the affect was normal [Normal Insight/Judgement] : insight and judgment were intact

## 2024-04-26 PROBLEM — M79.18 CERVICAL MYOFASCIAL PAIN SYNDROME: Status: ACTIVE | Noted: 2024-04-26

## 2024-04-26 PROBLEM — S39.012A LUMBOSACRAL STRAIN: Status: ACTIVE | Noted: 2024-04-26

## 2024-04-26 PROBLEM — R06.2 INSPIRATORY WHEEZE ON EXAMINATION: Status: ACTIVE | Noted: 2024-04-26

## 2024-04-28 LAB
24R-OH-CALCIDIOL SERPL-MCNC: 80.1 PG/ML
25(OH)D3 SERPL-MCNC: 34.9 NG/ML
ALBUMIN SERPL ELPH-MCNC: 4.9 G/DL
ALP BLD-CCNC: 58 U/L
ALT SERPL-CCNC: 25 U/L
ANION GAP SERPL CALC-SCNC: 18 MMOL/L
AST SERPL-CCNC: 21 U/L
BILIRUB SERPL-MCNC: 0.5 MG/DL
BUN SERPL-MCNC: 13 MG/DL
CALCIUM SERPL-MCNC: 10.4 MG/DL
CALCIUM SERPL-MCNC: 10.4 MG/DL
CHLORIDE SERPL-SCNC: 102 MMOL/L
CHOLEST SERPL-MCNC: 195 MG/DL
CO2 SERPL-SCNC: 22 MMOL/L
CREAT SERPL-MCNC: 0.93 MG/DL
EGFR: 63 ML/MIN/1.73M2
ESTIMATED AVERAGE GLUCOSE: 120 MG/DL
GLUCOSE SERPL-MCNC: 92 MG/DL
HBA1C MFR BLD HPLC: 5.8 %
HCT VFR BLD CALC: 45.6 %
HDLC SERPL-MCNC: 78 MG/DL
HGB BLD-MCNC: 14.7 G/DL
LDLC SERPL CALC-MCNC: 96 MG/DL
MCHC RBC-ENTMCNC: 30.2 PG
MCHC RBC-ENTMCNC: 32.2 GM/DL
MCV RBC AUTO: 93.8 FL
NONHDLC SERPL-MCNC: 118 MG/DL
PARATHYROID HORMONE INTACT: 84 PG/ML
PLATELET # BLD AUTO: 292 K/UL
POTASSIUM SERPL-SCNC: 4.8 MMOL/L
PROT SERPL-MCNC: 7.4 G/DL
RBC # BLD: 4.86 M/UL
RBC # FLD: 14.2 %
SODIUM SERPL-SCNC: 141 MMOL/L
TRIGL SERPL-MCNC: 123 MG/DL
TSH SERPL-ACNC: 2.83 UIU/ML
WBC # FLD AUTO: 8.45 K/UL

## 2024-04-28 NOTE — HISTORY OF PRESENT ILLNESS
[de-identified] : 78-year-old female with a history of hyperlipidemia, prediabetes, osteoporosis, sessile colonic polyp removed by laser in 2017 that was 4.5 cm here for her annual wellness visit.  She is here also for her first visit to establish care with me, as her PCP is no longer in the practice. s/p EMD-endoscopic mucosal disection and resection of her colonic polyp Retired nurse , worked in public health , lives with , 4 children-15 yo is youngest 2018 colonsocpy f/u 2021 negative and 2024-GI [facility]chols She has no specific complaints at this time but is concerned about her history of osteoporosis and that she gets occasional stiffness and achiness in her spine and neck, no trauma, no history of radicular symptoms or weakness in her extremities but would like x-rays for her spine and her neck to rule out DJD/compression fracture. ,  She also wants a Derm referral for routine skin cancer screening.  She has been feeling well.   History of osteoporosis- recalls taking Actonel for 2 years over 15 years ago.  Last bone density in November 2022 by gynecologist revealed osteoporosis and was begun on Boniva 150 mg p.o. daily by gynecologist Dr. Alejandra Ko  February 2023.  Next DEXA is due February 2025.  She is currently taking vitamin D 3, supplements include 1000, 2400 IU total 3400 daily and calcium 600 mg +300 mg from multivitamin daily.  She denies any history of kidney stones, no history of hypercalcemia on labs that are available for review, and is unaware if she has ever been screened for hyperparathyroidism.   Last mammogram on file Sept 2021-[facility] Needs [facility] records from [facility] for all mammo/DEXA

## 2024-04-28 NOTE — HEALTH RISK ASSESSMENT
[Very Good] : ~his/her~  mood as very good [Yes] : Yes [No] : In the past 12 months have you used drugs other than those required for medical reasons? No [No falls in past year] : Patient reported no falls in the past year [0] : 2) Feeling down, depressed, or hopeless: Not at all (0) [PHQ-2 Negative - No further assessment needed] : PHQ-2 Negative - No further assessment needed [Patient reported mammogram was normal] : Patient reported mammogram was normal [Patient reported bone density results were abnormal] : Patient reported bone density results were abnormal [None] : None [With Significant Other] : lives with significant other [] :  [Fully functional (bathing, dressing, toileting, transferring, walking, feeding)] : Fully functional (bathing, dressing, toileting, transferring, walking, feeding) [Fully functional (using the telephone, shopping, preparing meals, housekeeping, doing laundry, using] : Fully functional and needs no help or supervision to perform IADLs (using the telephone, shopping, preparing meals, housekeeping, doing laundry, using transportation, managing medications and managing finances) [With Patient/Caregiver] : , with patient/caregiver [Designated Healthcare Proxy] : Designated healthcare proxy [Name: ___] : Health Care Proxy's Name: [unfilled]  [Relationship: ___] : Relationship: [unfilled] [Never] : Never [de-identified] : Occasional [de-identified] : Walks in her neighborhood. [[id number]] : 0 [Change in mental status noted] : No change in mental status noted [Language] : denies difficulty with language [Behavior] : denies difficulty with behavior [Learning/Retaining New Information] : denies difficulty learning/retaining new information [Handling Complex Tasks] : denies difficulty handling complex tasks [Reasoning] : denies difficulty with reasoning [Spatial Ability and Orientation] : denies difficulty with spatial ability and orientation [Reports changes in hearing] : Reports no changes in hearing [Reports changes in vision] : Reports no changes in vision [MammogramDate] : 09/21 [PapSmearComments] : Still goes for Paps, managed through GYN [BoneDensityDate] : 10/20 [BoneDensityComments] : Osteopenia [ColonoscopyDate] : 04/21 [ColonoscopyComments] : 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

## 2024-05-03 ENCOUNTER — APPOINTMENT (OUTPATIENT)
Dept: ULTRASOUND IMAGING | Facility: CLINIC | Age: 79
End: 2024-05-03
Payer: MEDICARE

## 2024-05-03 ENCOUNTER — OUTPATIENT (OUTPATIENT)
Dept: OUTPATIENT SERVICES | Facility: HOSPITAL | Age: 79
LOS: 1 days | End: 2024-05-03
Payer: COMMERCIAL

## 2024-05-03 ENCOUNTER — APPOINTMENT (OUTPATIENT)
Dept: RADIOLOGY | Facility: CLINIC | Age: 79
End: 2024-05-03
Payer: MEDICARE

## 2024-05-03 DIAGNOSIS — E21.0 PRIMARY HYPERPARATHYROIDISM: ICD-10-CM

## 2024-05-03 DIAGNOSIS — M81.0 AGE-RELATED OSTEOPOROSIS WITHOUT CURRENT PATHOLOGICAL FRACTURE: ICD-10-CM

## 2024-05-03 PROCEDURE — 72050 X-RAY EXAM NECK SPINE 4/5VWS: CPT

## 2024-05-03 PROCEDURE — 72083 X-RAY EXAM ENTIRE SPI 4/5 VW: CPT | Mod: 26

## 2024-05-03 PROCEDURE — 72050 X-RAY EXAM NECK SPINE 4/5VWS: CPT | Mod: 26,59

## 2024-05-03 PROCEDURE — 71046 X-RAY EXAM CHEST 2 VIEWS: CPT | Mod: 26

## 2024-05-03 PROCEDURE — 76536 US EXAM OF HEAD AND NECK: CPT | Mod: 26

## 2024-05-03 PROCEDURE — 71046 X-RAY EXAM CHEST 2 VIEWS: CPT

## 2024-05-03 PROCEDURE — 72083 X-RAY EXAM ENTIRE SPI 4/5 VW: CPT

## 2024-05-03 PROCEDURE — 76536 US EXAM OF HEAD AND NECK: CPT

## 2024-05-03 PROCEDURE — 72084 X-RAY EXAM ENTIRE SPI 6/> VW: CPT

## 2024-05-10 ENCOUNTER — TRANSCRIPTION ENCOUNTER (OUTPATIENT)
Age: 79
End: 2024-05-10

## 2024-05-10 ENCOUNTER — APPOINTMENT (OUTPATIENT)
Dept: INTERNAL MEDICINE | Facility: CLINIC | Age: 79
End: 2024-05-10
Payer: MEDICARE

## 2024-05-10 ENCOUNTER — OUTPATIENT (OUTPATIENT)
Dept: OUTPATIENT SERVICES | Facility: HOSPITAL | Age: 79
LOS: 1 days | End: 2024-05-10

## 2024-05-10 DIAGNOSIS — I10 ESSENTIAL (PRIMARY) HYPERTENSION: ICD-10-CM

## 2024-05-10 DIAGNOSIS — M81.0 AGE-RELATED OSTEOPOROSIS W/OUT CURRENT PATHOLOGICAL FRACTURE: ICD-10-CM

## 2024-05-10 DIAGNOSIS — M19.011 PRIMARY OSTEOARTHRITIS, RIGHT SHOULDER: ICD-10-CM

## 2024-05-10 PROCEDURE — 99443: CPT | Mod: 93

## 2024-05-10 PROCEDURE — G0463: CPT

## 2024-05-14 PROBLEM — M81.0 OSTEOPOROSIS, POSTMENOPAUSAL: Status: ACTIVE | Noted: 2024-04-03

## 2024-05-14 PROBLEM — M19.011 ARTHROSIS OF RIGHT SHOULDER: Status: ACTIVE | Noted: 2024-05-10

## 2024-05-14 NOTE — HISTORY OF PRESENT ILLNESS
[Home] : at home, [unfilled] , at the time of the visit. [Medical Office: ([facility])___] : at the medical office located in  [Verbal consent obtained from patient] : the patient, [unfilled] [de-identified] : Request telehealth visit to discuss results of labs confirming elevated PTH level with intact Vit D OH and dihydroxy.    Bone density from 2022 compared to study from August 2015. Latest study 2022 shows osteoporosis in the spine and hip, slightly more than prior study of 2015.. Had labs done April 2024 that showed an elevated intact PTH 84 with a calcium 10.4 and adequate vitamin D levels suggestive of primary hyperparathyroidism.  Patient intrestedin f/u with head/neck surgeon. Family already knows Merrick Cain and plans to f/u. Patient aware and is trying to become more active and do more weightbearing exercises and to confirm that food has a minimum of calcium 1000 mg/day and vitamin D 2 to 3000 IU/day. Declines taking any biphosphonate's at this time and would like to wait another 2 years to see how she does with exercise weightbearing exercises and taking adequate calcium per day and vitamin D 2 to 3000/day. Also complaining of stiffness and occasionally pain in the right shoulder when using the arm in certain directions. Her prior imaging in the past that showed some arthritis in the joint and is willing to see orthopedist [facility] [place]

## 2024-05-15 ENCOUNTER — APPOINTMENT (OUTPATIENT)
Dept: OTOLARYNGOLOGY | Facility: CLINIC | Age: 79
End: 2024-05-15
Payer: MEDICARE

## 2024-05-15 VITALS
SYSTOLIC BLOOD PRESSURE: 134 MMHG | DIASTOLIC BLOOD PRESSURE: 87 MMHG | BODY MASS INDEX: 29.27 KG/M2 | HEIGHT: 61 IN | HEART RATE: 66 BPM | WEIGHT: 155 LBS

## 2024-05-15 DIAGNOSIS — Z86.39 PERSONAL HISTORY OF OTHER ENDOCRINE, NUTRITIONAL AND METABOLIC DISEASE: ICD-10-CM

## 2024-05-15 PROCEDURE — 99203 OFFICE O/P NEW LOW 30 MIN: CPT

## 2024-05-15 RX ORDER — RISEDRONATE SODIUM 35 MG/1
TABLET, DELAYED RELEASE ORAL
Refills: 0 | Status: ACTIVE | COMMUNITY

## 2024-05-15 NOTE — CONSULT LETTER
[Dear  ___] : Dear  [unfilled], [Consult Letter:] : I had the pleasure of evaluating your patient, [unfilled]. [Please see my note below.] : Please see my note below. [Consult Closing:] : Thank you very much for allowing me to participate in the care of this patient.  If you have any questions, please do not hesitate to contact me. [Sincerely,] : Sincerely, [FreeTextEntry2] : Jessy Tanner MD  [FreeTextEntry3] : Merrick Cain MD, FACS     [facility] Associate Chair    Department of Otolaryngology  Professor Otolaryngology & Molecular Medicine [facility]

## 2024-05-15 NOTE — REASON FOR VISIT
[Initial Evaluation] : an initial evaluation for [Spouse] : spouse [FreeTextEntry2] : elevated PTH level for Calcium

## 2024-05-15 NOTE — HISTORY OF PRESENT ILLNESS
[de-identified] : 79yo female who is being referrred by Dr. Tanner (PCP) for primary hyperparathyroidism.  4/26/2024 sonogram Scattered thyroid nodules including dominant left thyroid lower pole nodule. No parathyroid lesions identified.  4/3/2024 PTH 84 calcium 10.4  VitD25 34.9  TSH wnr 6/2014 calcium 10.4 12/21/2022 Bone density osteoporosis.. pt only recently been made aware Ca elevation  Reports some general fatigue and feeling tired majority of the day History of spinal and shoulder problems, following Orthopedist for that - unsure if related to current thyroid issues Currently taking Risedronate sodium 150mg monthly for Osteoporosis - also taking a Multivitamin with Calcium, has stopped taking Calcium supplement with vitamin D Denies pain, swelling, dyspnea, dysphonia, dysphagia, odynophagia, hemoptysis, mucus production, otalgia, hearing changes, recent fevers, throat or oral infections. NO weight loss..no constitutional sxs.  no hx stones.   no FH

## 2024-05-16 ENCOUNTER — APPOINTMENT (OUTPATIENT)
Dept: ORTHOPEDIC SURGERY | Facility: CLINIC | Age: 79
End: 2024-05-16
Payer: MEDICARE

## 2024-05-16 DIAGNOSIS — M25.511 PAIN IN RIGHT SHOULDER: ICD-10-CM

## 2024-05-16 DIAGNOSIS — M25.512 PAIN IN RIGHT SHOULDER: ICD-10-CM

## 2024-05-16 DIAGNOSIS — G89.29 PAIN IN RIGHT SHOULDER: ICD-10-CM

## 2024-05-16 PROCEDURE — 99203 OFFICE O/P NEW LOW 30 MIN: CPT

## 2024-05-16 PROCEDURE — 73030 X-RAY EXAM OF SHOULDER: CPT | Mod: LT

## 2024-05-22 ENCOUNTER — OUTPATIENT (OUTPATIENT)
Dept: OUTPATIENT SERVICES | Facility: HOSPITAL | Age: 79
LOS: 1 days | End: 2024-05-22
Payer: MEDICARE

## 2024-05-22 ENCOUNTER — APPOINTMENT (OUTPATIENT)
Dept: CT IMAGING | Facility: CLINIC | Age: 79
End: 2024-05-22
Payer: MEDICARE

## 2024-05-22 DIAGNOSIS — E21.3 HYPERPARATHYROIDISM, UNSPECIFIED: ICD-10-CM

## 2024-05-22 PROCEDURE — 70492 CT SFT TSUE NCK W/O & W/DYE: CPT | Mod: 26,MH

## 2024-05-22 PROCEDURE — 70492 CT SFT TSUE NCK W/O & W/DYE: CPT

## 2024-05-24 PROBLEM — M25.511 CHRONIC PAIN OF BOTH SHOULDERS: Status: ACTIVE | Noted: 2024-05-24

## 2024-05-24 NOTE — PHYSICAL EXAM
[de-identified] : Right shoulder exam:   Inspection: No malalignment, No defects, No atrophy Skin: No masses, No lesions Neck: Negative Spurling, full ROM, no pain with ROM AROM: FF to 160, abduction to 60, ER to 50, IR to lower lumbar Painful arc ROM: Pain with IR Tenderness: No bicipital tenderness, no tenderness to greater tuberosity/RTC insertion, +anterior shoulder/lesser tuberosity tenderness Strength: 5/5 ER, 5/5 IR in adduction, 5/5 supraspinatus testing, negative Edmunds's test AC joint: No TTP/pain with cross arm testing Biceps: Speed Negative, Yergason Negative Impingement test: + Rico, + Neer Vasc: 2+ radial pulse Stability: Stable Neuro: AIN, PIN, Ulnar nerve intact to motor Sensation: Intact to light touch throughout Other findings: None.   Left shoulder exam:   Inspection: No malalignment, No defects, No atrophy Skin: No masses, No lesions Neck: Negative Spurling, full ROM, no pain with ROM AROM: FF to 170, abduction to 80, ER to 50, IR to mid lumbar Painful arc ROM: none Tenderness: No bicipital tenderness, no tenderness to greater tuberosity/RTC insertion, +anterior shoulder/lesser tuberosity tenderness Strength: 5/5 ER, 5/5 IR in adduction, 5/5 supraspinatus testing, negative Edmunds's test AC joint: No TTP/pain with cross arm testing Biceps: Speed Negative, Yergason Negative Impingement test+Rico, +Neer Vasc: 2+ radial pulse Stability: Stable Neuro: AIN, PIN, Ulnar nerve intact to motor Sensation: Intact to light touch throughout Other findings: None.  [de-identified] : The following radiographs were ordered and read by me during this patients visit. I reviewed each radiograph in detail with the patient and discussed the findings as highlighted below.   3 views of bilateral shoulder were obtained today, 05/16/2024, that show no acute fracture or dislocation. There is mild glenohumeral and mild AC joint degenerative change seen. Type II acromion. There is no significant malalignment. No significant other obvious osseous abnormality, otherwise unremarkable.

## 2024-05-24 NOTE — ADDENDUM
[FreeTextEntry1] : This note was written by Maria D Uriarte on 05/16/2024 acting solely as a scribe for Dr. Tutu Samuels.  All medical record entries made by the Scribe were at my, Dr. Tutu Samuels, direction and personally dictated by me on 05/16/2024. I have personally reviewed the chart and agree that the record accurately reflects my personal performance of the history, physical exam, assessment and plan.

## 2024-05-24 NOTE — HISTORY OF PRESENT ILLNESS
[de-identified] : 78 year old female RHD presents today with bilateral shoulder pain x 2-3 months. R>L The pain is constant worse with internal rotation and laying on her sides. The pain is worse at night. Aleve/Tylenol taken PRN provides some relief. Reports radiation of pain into the right biceps. Recalls left shoulder RTC injury since 1998.   The patient's past medical history, past surgical history, medications and allergies were reviewed by me today with the patient and documented accordingly. In addition, the patient's family and social history, which were noncontributory to this visit, were reviewed also.

## 2024-05-24 NOTE — DISCUSSION/SUMMARY
[4 Weeks] : in 4 weeks [de-identified] : 79 y/o female with bilateral shoulder pain; R>L.  I discussed the treatment of degenerative shoulder arthritis with the patient at length today. I described the spectrum of treatment from nonoperative modalities to total joint arthroplasty. Noninvasive and nonoperative treatment modalities include activity modification with avoidance of overhead activity/excessive glenohumeral rotation, PRN use of acetaminophen or anti-inflammatory medication if tolerated, and physical therapy. Additional treatment can include intermittent corticosteroid injection for acute pain relief. Possible use of visco-supplementation also. The risks and benefits of each treatment options was discussed and all questions were answered.   At this time the recommendation is conservative care & observation, this includes rest/activity avoidance until less symptomatic with subsequent gradual return to full activity as tolerated. Patient may also use OTC NSAID's or acetaminophen as tolerated, with application of ice to the area 2-3x daily for 20 minutes after periods of activity.   Follow up as needed if symptoms persist.

## 2024-05-28 ENCOUNTER — APPOINTMENT (OUTPATIENT)
Dept: ORTHOPEDIC SURGERY | Facility: CLINIC | Age: 79
End: 2024-05-28
Payer: MEDICARE

## 2024-05-28 VITALS — HEIGHT: 61 IN | BODY MASS INDEX: 29.27 KG/M2 | WEIGHT: 155 LBS

## 2024-05-28 DIAGNOSIS — M41.9 SCOLIOSIS, UNSPECIFIED: ICD-10-CM

## 2024-05-28 DIAGNOSIS — M50.30 OTHER CERVICAL DISC DEGENERATION, UNSPECIFIED CERVICAL REGION: ICD-10-CM

## 2024-05-28 DIAGNOSIS — M51.34 OTHER INTERVERTEBRAL DISC DEGENERATION, THORACIC REGION: ICD-10-CM

## 2024-05-28 DIAGNOSIS — M51.36 OTHER INTERVERTEBRAL DISC DEGENERATION, LUMBAR REGION: ICD-10-CM

## 2024-05-28 DIAGNOSIS — S39.012D STRAIN OF MUSCLE, FASCIA AND TENDON OF LOWER BACK, SUBSEQUENT ENCOUNTER: ICD-10-CM

## 2024-05-28 PROCEDURE — 72070 X-RAY EXAM THORAC SPINE 2VWS: CPT

## 2024-05-28 PROCEDURE — 99204 OFFICE O/P NEW MOD 45 MIN: CPT

## 2024-05-28 PROCEDURE — 72040 X-RAY EXAM NECK SPINE 2-3 VW: CPT

## 2024-05-28 PROCEDURE — 72100 X-RAY EXAM L-S SPINE 2/3 VWS: CPT

## 2024-05-28 RX ORDER — MELOXICAM 15 MG/1
15 TABLET ORAL
Qty: 30 | Refills: 1 | Status: ACTIVE | COMMUNITY
Start: 2024-05-28 | End: 1900-01-01

## 2024-05-28 NOTE — PHYSICAL EXAM
[Antalgic] : antalgic [Galdamez's Sign] : negative Galdamez's sign [UE/LE] : Sensory: Intact in bilateral upper & lower extremities [ALL] : Biceps, brachioradialis, triceps, patellar, ankle and plantar 2+ and symmetric bilaterally [de-identified] : Cervical ROM: Restricted Lumbar ROM:Restricted TTP C/L spine, b/l paracervicals and b/l paraspinals L region.  Skin intact C/L spine. No rashes, ulcers, blisters.  No lymphedema.  Rapid alternating movements- Intact.  Full and non-painful ROM RUE, LUE, RLE, and LLE. Skin intact RUE, LUE, RLE, and LLE. No rashes, blisters, ulcers. NTTP RUE, LUE, RLE, and LLE. No evidence of dislocation or subluxation B/L [de-identified] : XRAY C, T, L Spine - [facility] imaging reviewed

## 2024-05-28 NOTE — HISTORY OF PRESENT ILLNESS
[de-identified] : Patient is a 78 year-old female who presents to the office today for initial evaluation of upper and middle back pain. Pain has been present for several years in the absence of injury or inciting event. Pain is across the upper back and shoulders and also in the upper thoracic region. It is constant in nature and preventing her from performing her ADLs and keeping her from her hobbies. It is a stabbing pain. Her PCP sent her for a whole spine xray that showed significant arthritis of the spine and some upper thoracic/lower cervical scoliotic changes that are mild in nature. He recommended surgical consultation and the patient presents today. She takes Aleve and Tylenol for pain, where Aleve is helpful and Tylenol only takes the edge off. She denies upper or lower extremity radicular symptoms. She endorses right hand ulnar nerve distribution paresthesias. She presents today for further treatment options.  Upon detailed questioning of the patient, they deny any complaints of fever, chills, sweats, nausea or vomiting, bowel or bladder dysfunction, recent weight loss or gain, or night pain. The above history is in addition to the intake form, completed by the patient, that I personally reviewed and discussed with her at length during this visit. There is no correlation identified between their presenting symptoms and their family, social and past medical history.

## 2024-05-28 NOTE — DISCUSSION/SUMMARY
[de-identified] : 77 yo female with c,t,l DDD and scoliosis, recommend PT, NSAIDS, RTO as needed.  Diagnosis, prognosis, natural history and treatment was discussed with patient. Patient was advised if the following symptoms develop: chills, fever,  loss of bladder control, bowel incontinence or urinary retention, numbness/tingling or weakness is present in upper or lower extremities, to go to the nearest emergency room. This may be a new clinical condition not present at the time of the patient visit  that may lead to paralysis and/or death, Patient advised if the above symptoms developed to also call the office immediately to inform us and to go to the nearest emergency room.

## 2024-05-30 ENCOUNTER — APPOINTMENT (OUTPATIENT)
Dept: ORTHOPEDIC SURGERY | Facility: CLINIC | Age: 79
End: 2024-05-30
Payer: MEDICARE

## 2024-05-30 VITALS — HEIGHT: 61 IN | BODY MASS INDEX: 29.27 KG/M2 | WEIGHT: 155 LBS

## 2024-05-30 DIAGNOSIS — E21.0 PRIMARY HYPERPARATHYROIDISM: ICD-10-CM

## 2024-05-30 DIAGNOSIS — M76.61 ACHILLES TENDINITIS, RIGHT LEG: ICD-10-CM

## 2024-05-30 DIAGNOSIS — R29.898 OTHER SYMPTOMS AND SIGNS INVOLVING THE MUSCULOSKELETAL SYSTEM: ICD-10-CM

## 2024-05-30 PROCEDURE — 73610 X-RAY EXAM OF ANKLE: CPT | Mod: RT

## 2024-05-30 PROCEDURE — 99204 OFFICE O/P NEW MOD 45 MIN: CPT

## 2024-05-30 NOTE — DISCUSSION/SUMMARY
[de-identified] : WBAT in supportive footwear, heel lifts.  Recommend a course of PT. Ice to affected area. Activity modification.

## 2024-05-30 NOTE — HISTORY OF PRESENT ILLNESS
[de-identified] : Pt. is a 79 year old female who presents for evaluation of her right ankle. No recent trauma but reports history of prior right ankle injury about two years ago. She has been experiencing intermittent pain since that injury. No recent treatment. WB without assistive device.  [] : no

## 2024-05-30 NOTE — PHYSICAL EXAM
[Right] : right foot and ankle [NL (40)] : plantar flexion 40 degrees [NL 30)] : inversion 30 degrees [NL (20)] : eversion 20 degrees [2+] : posterior tibialis pulse: 2+ [Normal] : saphenous nerve sensation normal [4___] : eversion 4[unfilled]/5 [] : non-antalgic [FreeTextEntry8] : Mild thickening Achilles watershed.

## 2024-06-11 ENCOUNTER — APPOINTMENT (OUTPATIENT)
Dept: NUCLEAR MEDICINE | Facility: IMAGING CENTER | Age: 79
End: 2024-06-11
Payer: MEDICARE

## 2024-06-11 ENCOUNTER — OUTPATIENT (OUTPATIENT)
Dept: OUTPATIENT SERVICES | Facility: HOSPITAL | Age: 79
LOS: 1 days | End: 2024-06-11
Payer: MEDICARE

## 2024-06-11 DIAGNOSIS — E21.0 PRIMARY HYPERPARATHYROIDISM: ICD-10-CM

## 2024-06-11 PROCEDURE — A9512: CPT

## 2024-06-11 PROCEDURE — 78072 PARATHYRD PLANAR W/SPECT&CT: CPT | Mod: 26,MH

## 2024-06-11 PROCEDURE — 78072 PARATHYRD PLANAR W/SPECT&CT: CPT

## 2024-06-11 PROCEDURE — A9500: CPT

## 2024-06-25 ENCOUNTER — APPOINTMENT (OUTPATIENT)
Dept: OTOLARYNGOLOGY | Facility: CLINIC | Age: 79
End: 2024-06-25
Payer: MEDICARE

## 2024-06-25 VITALS
HEART RATE: 83 BPM | DIASTOLIC BLOOD PRESSURE: 77 MMHG | OXYGEN SATURATION: 98 % | SYSTOLIC BLOOD PRESSURE: 138 MMHG | HEIGHT: 64 IN

## 2024-06-25 DIAGNOSIS — E21.3 HYPERPARATHYROIDISM, UNSPECIFIED: ICD-10-CM

## 2024-06-25 PROCEDURE — 99214 OFFICE O/P EST MOD 30 MIN: CPT

## 2024-07-24 ENCOUNTER — APPOINTMENT (OUTPATIENT)
Dept: INTERNAL MEDICINE | Facility: CLINIC | Age: 79
End: 2024-07-24
Payer: MEDICARE

## 2024-07-24 ENCOUNTER — NON-APPOINTMENT (OUTPATIENT)
Age: 79
End: 2024-07-24

## 2024-07-24 VITALS
SYSTOLIC BLOOD PRESSURE: 130 MMHG | BODY MASS INDEX: 26.29 KG/M2 | WEIGHT: 154 LBS | DIASTOLIC BLOOD PRESSURE: 80 MMHG | HEIGHT: 64 IN | HEART RATE: 81 BPM | OXYGEN SATURATION: 97 %

## 2024-07-24 DIAGNOSIS — Z01.818 ENCOUNTER FOR OTHER PREPROCEDURAL EXAMINATION: ICD-10-CM

## 2024-07-24 DIAGNOSIS — E21.0 PRIMARY HYPERPARATHYROIDISM: ICD-10-CM

## 2024-07-24 DIAGNOSIS — H40.9 UNSPECIFIED GLAUCOMA: ICD-10-CM

## 2024-07-24 PROCEDURE — 99215 OFFICE O/P EST HI 40 MIN: CPT

## 2024-07-24 RX ORDER — LATANOPROST/PF 0.005 %
0.01 DROPS OPHTHALMIC (EYE)
Qty: 5 | Refills: 3 | Status: ACTIVE | COMMUNITY
Start: 2024-07-24

## 2024-07-29 NOTE — ASSESSMENT
[High Risk Surgery - Intraperitoneal, Intrathoracic or Supringuinal Vascular Procedures] : High Risk Surgery - Intraperitoneal, Intrathoracic or Supringuinal Vascular Procedures - No (0) [Ischemic Heart Disease] : Ischemic Heart Disease - No (0) [Congestive Heart Failure] : Congestive Heart Failure - No (0) [Prior Cerebrovascular Accident or TIA] : Prior Cerebrovascular Accident or TIA - No (0) [Creatinine >= 2mg/dL (1 Point)] : Creatinine >= 2mg/dL - No (0) [Insulin-dependent Diabetic (1 Point)] : Insulin-dependent Diabetic - No (0) [0] : 0 , RCRI Class: I, Risk of Post-Op Cardiac Complications: 3.9%, 95% CI for Risk Estimate: 2.8% - 5.4% [Patient Optimized for Surgery] : Patient optimized for surgery [No Further Testing Recommended] : no further testing recommended [As per surgery] : as per surgery [FreeTextEntry7] : Continue taking atorvastatin in the evening and can take ophthalmic drops for at bedtime.  Risedronate is taken once a week and to take it on another day other than the day of surgery

## 2024-07-29 NOTE — HISTORY OF PRESENT ILLNESS
[No Pertinent Cardiac History] : no history of aortic stenosis, atrial fibrillation, coronary artery disease, recent myocardial infarction, or implantable device/pacemaker [No Pertinent Pulmonary History] : no history of asthma, COPD, sleep apnea, or smoking [No Adverse Anesthesia Reaction] : no adverse anesthesia reaction in self or family member [(Patient denies any chest pain, claudication, dyspnea on exertion, orthopnea, palpitations or syncope)] : Patient denies any chest pain, claudication, dyspnea on exertion, orthopnea, palpitations or syncope [Good (7-10 METs)] : Good (7-10 METs) [Chronic Anticoagulation] : no chronic anticoagulation [Chronic Kidney Disease] : no chronic kidney disease [Diabetes] : no diabetes [FreeTextEntry1] : Neck exploration and parathyroidectomy [FreeTextEntry2] : Aug 2, 2024 [FreeTextEntry3] : Dr Merrick Cain [FreeTextEntry4] : 78-year-old female with history of osteoporosis, hyperparathyroidism, hyperlipidemia and prediabetes who is referred by her surgeon for medical clearance.  She is scheduled for parathyroidectomy on August 2, 2024.  Her last physical April 2024 revealed an elevated PTH level with normal vitamin D levels.  She is currently on Risedronate and Vitamin D. She feels well with no complaints. PST planned for this aternoon

## 2024-07-29 NOTE — RESULTS/DATA
[] : results reviewed [de-identified] : normal [de-identified] : BMP-normal [de-identified] : NSR no acute ST/T wave changes

## 2024-07-29 NOTE — RESULTS/DATA
[] : results reviewed [de-identified] : normal [de-identified] : BMP-normal [de-identified] : NSR no acute ST/T wave changes

## 2024-07-29 NOTE — RESULTS/DATA
[] : results reviewed [de-identified] : normal [de-identified] : BMP-normal [de-identified] : NSR no acute ST/T wave changes

## 2024-08-01 ENCOUNTER — TRANSCRIPTION ENCOUNTER (OUTPATIENT)
Age: 79
End: 2024-08-01

## 2024-08-01 NOTE — ASU PATIENT PROFILE, ADULT - PAIN CHRONIC, PROFILE
Procedure and recovery completed without complication. Pt AAOx4, in NAD, vitals recovered to baseline. Pt discussed case with MD. Discharge instructions reviewed, pt verbalizes understanding.    no

## 2024-08-01 NOTE — ASU PATIENT PROFILE, ADULT - CENTRAL VENOUS CATHETER
[facility]  Office: [address], DO, Gray Helm, DO, Lisa Peterson, DO, Roselia Lizz Scott, DO, Cisco Contreras MD, Joseph Mata MD, Angelica Perez MD, Luvenia Duverney, MD,  Rodrigue Elizabeth MD, Mo Birch MD, Javi Escalante, DO, Alejandra George MD,  Wiley Kendrick MD, Mary Potts MD, Shaila Lainez DO, Harish Torres MD, Dawood Ahmadi MD, Abigail Matias DO, Thong Murray MD, Flavio Chau MD, Mariel Tai MD, Praful Al MD, Philip Dickens, DO, Nola Rios MD, Mo Navarrete MD, Lenard Israel, Catherine Hurtado, CNP, Ck Lara, CNP, Lesly Adams, CNP,  Rudy Brooks, [facility], Venkatesh Guo, CNP, Shanel Figueredo, CNP, Shanda Shepard, CNP, Mendy Persaud, CNP, Belinda Mora, CNP, Gus Knight PA-C, Oral Da Silva, KAITY, Dearl , [facility], Arjun Mcgraw, CNP, Steph Heck, CNP, Noreen Levy, [address] [place] [address]    Progress Note    10/29/2022    1:01 PM    Name:   Samson Matthews  MRN:     [id number]     Acct:      [de-identified]   Room:   2017/2017-01  IP Day:  2  Admit Date:  10/27/2022 10:15 AM    PCP:   No primary care provider on file. Code Status:  Full Code    Subjective:     C/C: Chest pain   Interval History Status: not changed. Laying flat in bed has some wheezing but dose not feel SOB  He is still having some chest pain that is sharp, 5/10 on nitro gtt and non radiating. He has no leg swelling. Brief History:     Patient with known past medical history of coronary artery disease, had multivessel disease on cardiac cath at [address] [place] [address] earlier felt to be not a candidate for CABG, transferred to our facility afterwards at some point when he cardiac cath on 10/24 and received a stent mid and proximal RCA and the plan was for staged PCI of LAD and diagonal using rotational arthrectomy but then the patient left AMA .   This time patient presented to [facility] ER by EMS for significant episode of chest pain or shortness of breath , troponin elevated at 419, cardiology [Dr. Haritha Siu was consulted who recommended initiating heparin drip and transferred son Jocelyn Larry. Patient was air flighted to our facility for cardiology input  Currently She denies any chest pain, sob, nausea, vomiting, fever or chills    Review of Systems:     Constitutional:  negative for chills, fevers, sweats  Respiratory:  - cough, dyspnea on exertion, shortness of breath, +wheezing  Cardiovascular:+ chest pain, -chest pressure/discomfort, lower extremity edema, palpitations  Gastrointestinal:  negative for abdominal pain, constipation, diarrhea, nausea, vomiting  Neurological:  negative for dizziness, headache    Medications: Allergies:     Allergies   Allergen Reactions    Bactrim [Sulfamethoxazole-Trimethoprim] Nausea And Vomiting and Nausea Only    Neurontin [Gabapentin] Anaphylaxis     Swelling of both face and throat - difficulty breathing  Swelling of both face and throat - difficulty breathing    Nsaids Other (See Comments)     polycystic kidney disease    Tolmetin Other (See Comments)     polycystic kidney disease    Diatrizoate     Elavil [Amitriptyline]      Caused liver to stop functioning properly  Caused liver to stop functioning properly    Fentanyl Itching    Hydrocodone     Lipitor [Atorvastatin] Other (See Comments)     Pt states raises his liver enzymes  Pt states raises his liver enzymes      Dye [Iodides] Itching, Nausea And Vomiting and Nausea Only    Iodine Nausea And Vomiting    Pcn [Penicillins] Nausea And Vomiting and Nausea Only    Toradol [Ketorolac Tromethamine] Nausea And Vomiting    Tramadol Nausea And Vomiting and Rash       Current Meds:   Scheduled Meds:    guaiFENesin  600 mg Oral BID    ipratropium-albuterol  1 ampule Inhalation Q4H WA    sodium chloride flush  5-40 mL IntraVENous 2 times per day    metoprolol tartrate  25 mg Oral BID    atorvastatin  80 mg Oral Nightly aspirin  81 mg Oral Daily    heparin (porcine)  80 Units/kg IntraVENous Once    azithromycin  500 mg IntraVENous Q24H    cefTRIAXone (ROCEPHIN) IV  1,000 mg IntraVENous Q24H    clonazePAM  1 mg Oral TID    QUEtiapine  300 mg Oral Nightly    ticagrelor  90 mg Oral BID     Continuous Infusions:    nitroGLYCERIN 10 mcg/min (10/28/22 1612)    sodium chloride      heparin (PORCINE) Infusion 18.99 Units/kg/hr (10/29/22 1221)     PRN Meds: morphine, sodium chloride flush, sodium chloride, potassium chloride **OR** potassium alternative oral replacement **OR** potassium chloride, magnesium sulfate, ondansetron **OR** ondansetron, acetaminophen **OR** acetaminophen, magnesium hydroxide, nitroGLYCERIN, heparin (porcine), heparin (porcine), morphine    Data:     Past Medical History:   has a past medical history of Anxiety, Atrial flutter ([address] Blood circulation, collateral, Brainstem hemorrhage ([address] CAD (coronary artery disease), Carotid artery disease ([address] Cerebral artery occlusion with cerebral infarction ([address] Chronic kidney disease, Dependency on pain medication ([address] Depression, Headache(784.0), History of suicidal tendencies, Hyperlipidemia, Hypertension, MVP (mitral valve prolapse), Narcotic abuse ([address] Neuromuscular disorder ([address] Pacemaker, Poor intravenous access, Psychiatric problem, SSS (sick sinus syndrome) ([address] Tobacco abuse, Wears dentures, Wears glasses, and Wrist pain, right. Social History:   reports that he has been smoking cigarettes. He has a 14.00 pack-year smoking history. He has never used smokeless tobacco. He reports current alcohol use. He reports current drug use. Drugs:  and Marijuana Avelina Trivedi).      Family History:   Family History   Problem Relation Age of Onset    Liver Disease Mother     Heart Disease Mother     Migraines Mother     Diabetes Father     Hearing Loss Father     Heart Disease Father     High Blood Pressure Father     Kidney Disease Father     High Blood Pressure Maternal Grandfather     Hearing Loss Sister     Kidney Disease Sister     Hearing Loss Brother     High Blood Pressure Brother     Kidney Disease Brother     High Blood Pressure Maternal Grandmother        Vitals:  /65   Pulse 60   Temp 97.6 °F (36.4 °C) (Oral)   Resp 18   Ht 5' 10\" (1.778 m)   Wt 179 lb 10.8 oz (81.5 kg)   SpO2 94%   BMI 25.78 kg/m²   Temp (24hrs), Av °F (36.7 °C), Min:97.6 °F (36.4 °C), Max:98.7 °F (37.1 °C)    No results for input(s): POCGLU in the last 72 hours. I/O (24Hr): Intake/Output Summary (Last 24 hours) at 10/29/2022 1301  Last data filed at 10/29/2022 0553  Gross per 24 hour   Intake 663.73 ml   Output --   Net 663.73 ml       Labs:  Hematology:  Recent Labs     10/27/22  0515 10/27/22  1223 10/28/22  0048 10/29/22  0550   WBC 13.8* 12.0* 11.4* 8.6   RBC 4.55 4.48 4.10* 4.17*   HGB 12.6* 12.5* 11.4* 11.7*   HCT 38.1* 38.3* 35.0* 35.5*   MCV 83.7 85.5 85.4 85.1   MCH 27.6 27.9 27.8 28.1   MCHC 33.0 32.6 32.6 33.0   RDW 14.5 13.6 13.5 13.2    183 199 206   MPV 7.4 10.1 10.6 10.4   INR 0.9  --   --   --    DDIMER 1.34*  --   --   --      Chemistry:  Recent Labs     10/27/22  0515 10/27/22  0640 10/28/22  0048 10/28/22  0623 10/28/22  1400   *  --  137  --   --    K 4.5  --  4.7  --   --    CL 96*  --  102  --   --    CO2 26  --  24  --   --    GLUCOSE 96  --  96  --   --    BUN 61*  --  38*  --   --    CREATININE 1.89*  --  1.31*  --   --    MG 2.2  --  2.1  --   --    ANIONGAP 12  --  11  --   --    LABGLOM 41*  --  >60  --   --    CALCIUM 9.1  --  8.1*  --   --    PROBNP 266  --  175  --   --    TROPHS 420*   < > 392* 382* 339*   MYOGLOBIN  --   --   --   --  22*    < > = values in this interval not displayed.      Recent Labs     10/27/22  0515 10/28/22  0048   PROT 7.3 6.5   LABALBU 3.9 3.3*   AST 26 18   ALT 28 21   ALKPHOS 89 67   BILITOT 0.4 0.3   LIPASE 16  --    CHOL  --  176   HDL  --  34*   LDLCHOLESTEROL  --  114   CHOLHDLRATIO  --  5.2* ([facility] 10/27/2022 Yes    Essential hypertension (Chronic) 10/27/2022 Yes    S/P coronary artery stent placement 10/27/2022 Yes    Mixed hyperlipidemia (Chronic) 10/27/2022 Yes    Cardiac pacemaker (Chronic) 10/27/2022 Yes    Overview Signed 8/15/2017 12:06 PM by Estelle Guerrero DO     [facility] cardiologist            Plan:        NSTEMI: Aspirin/Brilinta  statin, heparin drip, nitro drip plan for staged PCI to LAD diagonal on Monday. BROOK: improving, Scr down to 1.3 from 1.8  Pneumonia: continue antibiotics, breathing treatments, mucolytic's. Check viral panel, blood/sputum culture and continue abx. Continue home Klonopin   Bipolar: continue Seroquel.    PTOT  Labs,imaging, ECG reviewed     Neal Sacuedo DO  10/29/2022  1:01 PM no

## 2024-08-02 ENCOUNTER — OUTPATIENT (OUTPATIENT)
Dept: OUTPATIENT SERVICES | Facility: HOSPITAL | Age: 79
LOS: 1 days | Discharge: HOME | End: 2024-08-02
Payer: MEDICARE

## 2024-08-02 ENCOUNTER — TRANSCRIPTION ENCOUNTER (OUTPATIENT)
Age: 79
End: 2024-08-02

## 2024-08-02 ENCOUNTER — APPOINTMENT (OUTPATIENT)
Dept: OTOLARYNGOLOGY | Facility: HOSPITAL | Age: 79
End: 2024-08-02

## 2024-08-02 VITALS
OXYGEN SATURATION: 100 % | HEIGHT: 61 IN | TEMPERATURE: 99 F | RESPIRATION RATE: 15 BRPM | HEART RATE: 70 BPM | DIASTOLIC BLOOD PRESSURE: 75 MMHG | SYSTOLIC BLOOD PRESSURE: 126 MMHG | WEIGHT: 154.1 LBS

## 2024-08-02 VITALS
OXYGEN SATURATION: 95 % | SYSTOLIC BLOOD PRESSURE: 120 MMHG | RESPIRATION RATE: 13 BRPM | HEART RATE: 94 BPM | DIASTOLIC BLOOD PRESSURE: 64 MMHG

## 2024-08-02 DIAGNOSIS — E21.3 HYPERPARATHYROIDISM, UNSPECIFIED: ICD-10-CM

## 2024-08-02 DIAGNOSIS — Z98.41 CATARACT EXTRACTION STATUS, RIGHT EYE: Chronic | ICD-10-CM

## 2024-08-02 DIAGNOSIS — Z87.19 PERSONAL HISTORY OF OTHER DISEASES OF THE DIGESTIVE SYSTEM: Chronic | ICD-10-CM

## 2024-08-02 DIAGNOSIS — Z98.83 FILTERING (VITREOUS) BLEB AFTER GLAUCOMA SURGERY STATUS: Chronic | ICD-10-CM

## 2024-08-02 DIAGNOSIS — Z98.890 OTHER SPECIFIED POSTPROCEDURAL STATES: Chronic | ICD-10-CM

## 2024-08-02 LAB
GLUCOSE BLDC GLUCOMTR-MCNC: 87 MG/DL — SIGNIFICANT CHANGE UP (ref 70–99)
PTH INTACT, INTRAOP SPECIMEN 2: SIGNIFICANT CHANGE UP
PTH INTACT, INTRAOP SPECIMEN 3: SIGNIFICANT CHANGE UP
PTH INTACT, INTRAOP TIMING 2: SIGNIFICANT CHANGE UP
PTH INTACT, INTRAOP TIMING 3: SIGNIFICANT CHANGE UP
PTH INTACT, INTRAOPERATIVE 2: 127 PG/ML — HIGH (ref 15–65)
PTH INTACT, INTRAOPERATIVE 3: 130 PG/ML — HIGH (ref 15–65)
PTH-INTACT IO % DIF SERPL: 117 PG/ML — HIGH (ref 15–65)

## 2024-08-02 PROCEDURE — 88331 PATH CONSLTJ SURG 1 BLK 1SPC: CPT | Mod: 26

## 2024-08-02 PROCEDURE — 88305 TISSUE EXAM BY PATHOLOGIST: CPT | Mod: 26

## 2024-08-02 PROCEDURE — 60500 EXPLORE PARATHYROID GLANDS: CPT | Mod: GC

## 2024-08-02 PROCEDURE — 88341 IMHCHEM/IMCYTCHM EA ADD ANTB: CPT | Mod: 26

## 2024-08-02 PROCEDURE — 88342 IMHCHEM/IMCYTCHM 1ST ANTB: CPT | Mod: 26

## 2024-08-02 DEVICE — CARTRIDGE MICROCLIP 30: Type: IMPLANTABLE DEVICE | Status: FUNCTIONAL

## 2024-08-02 DEVICE — LIGATING CLIPS WECK HORIZON SMALL-WIDE (RED) 24: Type: IMPLANTABLE DEVICE | Status: FUNCTIONAL

## 2024-08-02 DEVICE — SURGICEL 2 X 14": Type: IMPLANTABLE DEVICE | Status: FUNCTIONAL

## 2024-08-02 RX ORDER — OXYCODONE HYDROCHLORIDE 30 MG/1
5 TABLET ORAL ONCE
Refills: 0 | Status: DISCONTINUED | OUTPATIENT
Start: 2024-08-02 | End: 2024-08-02

## 2024-08-02 RX ORDER — ONDANSETRON HCL/PF 4 MG/2 ML
4 VIAL (ML) INJECTION ONCE
Refills: 0 | Status: ACTIVE | OUTPATIENT
Start: 2024-08-02 | End: 2025-07-01

## 2024-08-02 RX ORDER — OXYCODONE HYDROCHLORIDE 30 MG/1
1 TABLET ORAL
Qty: 8 | Refills: 0
Start: 2024-08-02 | End: 2024-08-03

## 2024-08-02 RX ORDER — FENTANYL CITRATE 1200 UG/1
25 LOZENGE ORAL; TRANSMUCOSAL
Refills: 0 | Status: DISCONTINUED | OUTPATIENT
Start: 2024-08-02 | End: 2024-08-02

## 2024-08-02 NOTE — ASU DISCHARGE PLAN (ADULT/PEDIATRIC) - ASU DC SPECIAL INSTRUCTIONSFT
Regular diet  Tylenol and for pain  oxycodone for severe pain  Resume home medications  No strenuous activity or heavy lifting for 2 weeks  Ok to bathe below neck, can shower after 48 hours, do not scrub incision, pat dry  Follow up with Dr. Cain

## 2024-08-02 NOTE — ASU DISCHARGE PLAN (ADULT/PEDIATRIC) - CARE PROVIDER_API CALL
Merrick Cain  Head/Neck Surgery  [address] [place] [address]  Phone: [phone]  Fax: [phone]  Follow Up Time:

## 2024-08-02 NOTE — ASU DISCHARGE PLAN (ADULT/PEDIATRIC) - NURSING INSTRUCTIONS
YOU HAVE RECEIVED TYLENOL DURING YOUR HOSPITAL STAY. THE MAX AMOUNT OF TYLENOL DAILY IS 4000MG. PLEASE KEEP THAT IN MIND IF YOU ARE TAKING OTHER PRESCRIPTION OR OVER THE COUNTER PAIN MEDICATIONS OR COLD MEDICINE. THE NEXT TIME THAT YOU CAN TAKE TYLENOL IS AT _8:00 PM_. YOU CAN THEN START TAKING IT EVERY 6 HOURS AS NEEDED FOR PAIN

## 2024-08-02 NOTE — ASU DISCHARGE PLAN (ADULT/PEDIATRIC) - NS MD DC FALL RISK RISK
For information on Fall & Injury Prevention, visit: [email or web address] OR  [email or web address] OR  https://www.cdc.gov/steadi/patient.html
No

## 2024-08-02 NOTE — ASU PREOP CHECKLIST - ORDERS/MEDICATION ADMINISTRATION RECORD ON CHART
PMD last admitted Maciel Gleason, PCP  76-year-old male past medical history multiple myeloma on the last 6/28/2024, BPH with obstruction, OA, multiple UTIs anemia, renal colic, gout, systolic dysfunction with WMA.  Patient presents from home for complaint of diarrhea.  Patient was admitted approximate month ago for UTI had also complained of diarrhea at that time patient reports that C. difficile testing was negative twice.  He was discharged home and has had some diarrhea persisting ever since but 2 days ago became unmanageable "explosive" watery diarrhea multiple times a day with anorexia Tmax yesterday 101.1 there is no nausea vomiting abdominal pain chest pain short of breath.  Patient had been scheduled for hip replacement in 3 days.  Physical exam adult male awake alert looking mildly fatigued.  HEENT normocephalic atraumatic.  Neck supple.  Chest clear A&P.  No respiratory distress no use of  muscles.  CV no rubs gallops murmurs, mildly tachycardic.  Abdomen soft positive bowel sounds no rebound guarding or masses.  Neuro GCS 15 speech fluent awake and alert.  Juventino Rodríguez MD, Facep
done

## 2024-08-02 NOTE — ASU PREOP CHECKLIST - TEMPERATURE IN FAHRENHEIT (DEGREES F)
Subjective:       Patient ID: Kat Norris is a 42 y.o. female.    Chief Complaint: Rash    Rash  The affected locations include the groin and right axilla. The rash is characterized by itchiness. She was exposed to nothing. Pertinent negatives include no anorexia, congestion, cough, diarrhea, eye pain, facial edema, fatigue, fever, joint pain, nail changes, rhinorrhea, shortness of breath, sore throat or vomiting. Her past medical history is significant for eczema. There is no history of asthma or varicella.     Past Medical History:   Diagnosis Date    33 weeks gestation of pregnancy 2021    Iron deficiency anemia     Lymphadenopathy     Pancytopenia     Sarcoidosis of lung       Past Surgical History:   Procedure Laterality Date     SECTION      x 2     SECTION N/A 2021    Procedure:  SECTION;  Surgeon: Timoteo Aguilera MD;  Location: [facility] L[facility] OR;  Service: OB/GYN;  Laterality: N/A;    LYMPH NODE BIOPSY       Family History   Problem Relation Age of Onset    Hypertension Maternal Grandmother     Diabetes Maternal Grandmother     Stroke Father     Lupus Sister 36    Diabetes Maternal Grandfather     Hypertension Maternal Grandfather     Seizures Mother     Melanoma Neg Hx      Social History     Socioeconomic History    Marital status:    Occupational History     Employer: [employer]   Tobacco Use    Smoking status: Former Smoker     Packs/day: 0.50     Years: 10.00     Pack years: 5.00     Types: Cigarettes     Quit date: 2013     Years since quittin.6    Smokeless tobacco: Never Used   Substance and Sexual Activity    Alcohol use: Yes     Comment: Pt states she drink alcohol on the weekends.    Drug use: No    Sexual activity: Yes     Partners: Male     Birth control/protection: OCP   Social History Narrative     since     Together since     He works at [employer]    She works on the [employer]     Social Determinants of Health  "    Financial Resource Strain: Unknown    Difficulty of Paying Living Expenses: Patient refused   Food Insecurity: Unknown    Worried About Running Out of Food in the Last Year: Patient refused    Ran Out of Food in the Last Year: Patient refused   Transportation Needs: Unknown    Lack of Transportation (Medical): Patient refused   Physical Activity: Inactive    Days of Exercise per Week: 7 days    Minutes of Exercise per Session: 0 min   Stress: No Stress Concern Present    Feeling of Stress : Not at all   Social Connections: Unknown    Frequency of Social Gatherings with Friends and Family: Never    Active Member of Clubs or Organizations: No    Attends Club or Organization Meetings: Never    Marital Status:    Housing Stability: Unknown    Unable to Pay for Housing in the Last Year: No    Unstable Housing in the Last Year: No       Review of Systems   Constitutional: Negative for fatigue and fever.   HENT: Negative for nasal congestion, rhinorrhea and sore throat.    Eyes: Negative for pain.   Respiratory: Negative for cough and shortness of breath.    Gastrointestinal: Negative for anorexia, diarrhea and vomiting.   Musculoskeletal: Negative for joint pain.   Integumentary:  Positive for rash. Negative for nail changes.         Objective:       Vitals:    05/03/22 1534   BP: 130/80   Pulse: 82   SpO2: 98%   Weight: 117 kg (257 lb 15 oz)   Height: 5' 6" (1.676 m)       Physical Exam  Constitutional:       General: She is not in acute distress.     Appearance: Normal appearance. She is not ill-appearing, toxic-appearing or diaphoretic.   HENT:      Head: Normocephalic and atraumatic.   Skin:         Neurological:      Mental Status: She is alert.         Assessment:       Problem List Items Addressed This Visit    None     Visit Diagnoses     Yeast dermatitis    -  Primary    Relevant Medications    fluconazole (DIFLUCAN) 150 MG Tab    ketoconazole (NIZORAL) 2 % cream    Morbid obesity        " Relevant Orders    Hemoglobin A1C          Plan:       Kat was seen today for rash.    Diagnoses and all orders for this visit:    Yeast dermatitis  -     fluconazole (DIFLUCAN) 150 MG Tab; Take 1 tablet (150 mg total) by mouth once. for 1 dose  -     ketoconazole (NIZORAL) 2 % cream; Apply topically once daily.    Morbid obesity  -     Hemoglobin A1C; Future              98.6

## 2024-08-08 ENCOUNTER — APPOINTMENT (OUTPATIENT)
Dept: OTOLARYNGOLOGY | Facility: CLINIC | Age: 79
End: 2024-08-08

## 2024-08-08 PROCEDURE — 99024 POSTOP FOLLOW-UP VISIT: CPT

## 2024-08-08 NOTE — HISTORY OF PRESENT ILLNESS
[de-identified] : 78yo female who is s/p neck exploration and left inferior pole nodule removal on 8/2/2024 for hyperparathyroidism.  Presents today for suture removal. Denies pain, dysphagia, dysphonia and or dyspnea.  Atb surgery what was felt to be a rel large adenoma was removed but could not confirm path

## 2024-08-08 NOTE — HISTORY OF PRESENT ILLNESS
[de-identified] : 78yo female who is s/p neck exploration and left inferior pole nodule removal on 8/2/2024 for hyperparathyroidism.  Presents today for suture removal. Denies pain, dysphagia, dysphonia and or dyspnea.  Atb surgery what was felt to be a rel large adenoma was removed but could not confirm path

## 2024-08-08 NOTE — CONSULT LETTER
[Dear  ___] : Dear  [unfilled], [Courtesy Letter:] : I had the pleasure of seeing your patient, [unfilled], in my office today. [Please see my note below.] : Please see my note below. [Sincerely,] : Sincerely, [FreeTextEntry2] : Jessy Tanner MD   [FreeTextEntry3] : EDUIN Clark MD, [place] [address] Tel: [phone]  [facility] Associate Chair Department of Otolaryngology Professor of Otolaryngology & Molecular Medicine [facility] [employer] [facility]

## 2024-08-08 NOTE — CONSULT LETTER
[Dear  ___] : Dear  [unfilled], [Courtesy Letter:] : I had the pleasure of seeing your patient, [unfilled], in my office today. [Please see my note below.] : Please see my note below. [Sincerely,] : Sincerely, [FreeTextEntry2] : Jessy Tanner MD   [FreeTextEntry3] : EDUIN Clark MD, [place] [address] Tel: [phone]  [facility] Associate Chair Department of Otolaryngology Professor of Otolaryngology & Molecular Medicine [facility]

## 2024-08-12 PROBLEM — M81.0 AGE-RELATED OSTEOPOROSIS WITHOUT CURRENT PATHOLOGICAL FRACTURE: Chronic | Status: ACTIVE | Noted: 2024-07-24

## 2024-08-12 PROBLEM — M76.61 ACHILLES TENDINITIS, RIGHT LEG: Chronic | Status: ACTIVE | Noted: 2024-07-24

## 2024-08-12 PROBLEM — N84.0 POLYP OF CORPUS UTERI: Chronic | Status: ACTIVE | Noted: 2024-07-24

## 2024-08-12 PROBLEM — E78.5 HYPERLIPIDEMIA, UNSPECIFIED: Chronic | Status: ACTIVE | Noted: 2024-07-24

## 2024-08-12 PROBLEM — R73.03 PREDIABETES: Chronic | Status: ACTIVE | Noted: 2024-07-24

## 2024-08-12 PROBLEM — Z86.69 PERSONAL HISTORY OF OTHER DISEASES OF THE NERVOUS SYSTEM AND SENSE ORGANS: Chronic | Status: ACTIVE | Noted: 2024-07-24

## 2024-08-12 PROBLEM — Z98.890 OTHER SPECIFIED POSTPROCEDURAL STATES: Chronic | Status: ACTIVE | Noted: 2024-07-24

## 2024-08-14 LAB — SURGICAL PATHOLOGY STUDY: SIGNIFICANT CHANGE UP

## 2024-08-21 ENCOUNTER — APPOINTMENT (OUTPATIENT)
Dept: OTOLARYNGOLOGY | Facility: CLINIC | Age: 79
End: 2024-08-21
Payer: MEDICARE

## 2024-08-21 VITALS
WEIGHT: 155 LBS | HEIGHT: 64 IN | SYSTOLIC BLOOD PRESSURE: 137 MMHG | OXYGEN SATURATION: 100 % | HEART RATE: 67 BPM | BODY MASS INDEX: 26.46 KG/M2 | DIASTOLIC BLOOD PRESSURE: 80 MMHG

## 2024-08-21 PROCEDURE — 99024 POSTOP FOLLOW-UP VISIT: CPT

## 2024-08-21 NOTE — HISTORY OF PRESENT ILLNESS
[de-identified] : 80yo female who is s/p neck exploration and left inferior pole nodule removal on 8/2/2024 for hyperparathyroidism. Presetsn today for a post op follow up. pt had removal benign thyroid nodule at [facility] despite loc imgaing suggesting L lower pole issue and introp susp this was parathyuroid on frozen.  There was no other loc on CT and MIBI, thus no addl expl performed

## 2024-08-21 NOTE — CONSULT LETTER
[Dear  ___] : Dear  [unfilled], [Courtesy Letter:] : I had the pleasure of seeing your patient, [unfilled], in my office today. [Please see my note below.] : Please see my note below. [Sincerely,] : Sincerely, [FreeTextEntry2] : Jessy Tanner MD   [FreeTextEntry3] : Merrick Cain MD, FACS     [facility] Associate Chair    Department of Otolaryngology  Professor Otolaryngology & Molecular Medicine [facility]

## 2024-08-21 NOTE — HISTORY OF PRESENT ILLNESS
[de-identified] : 78yo female who is s/p neck exploration and left inferior pole nodule removal on 8/2/2024 for hyperparathyroidism. Presetsn today for a post op follow up. pt had removal benign thyroid nodule at [facility] despite loc imgaing suggesting L lower pole issue and introp susp this was parathyuroid on frozen.  There was no other loc on CT and MIBI, thus no addl expl performed

## 2024-08-21 NOTE — CONSULT LETTER
[Dear  ___] : Dear  [unfilled], [Courtesy Letter:] : I had the pleasure of seeing your patient, [unfilled], in my office today. [Please see my note below.] : Please see my note below. [Sincerely,] : Sincerely, [FreeTextEntry2] : Jessy Tanner MD   [FreeTextEntry3] : Merrick Cain MD, FACS     [facility] Associate Chair    Department of Otolaryngology  Professor Otolaryngology & Molecular Medicine [facility]

## 2024-08-27 DIAGNOSIS — E21.0 PRIMARY HYPERPARATHYROIDISM: ICD-10-CM

## 2024-09-18 ENCOUNTER — APPOINTMENT (OUTPATIENT)
Dept: OTOLARYNGOLOGY | Facility: CLINIC | Age: 79
End: 2024-09-18

## 2024-10-08 ENCOUNTER — APPOINTMENT (OUTPATIENT)
Dept: OTOLARYNGOLOGY | Facility: CLINIC | Age: 79
End: 2024-10-08
Payer: MEDICARE

## 2024-10-08 VITALS
DIASTOLIC BLOOD PRESSURE: 79 MMHG | HEART RATE: 83 BPM | BODY MASS INDEX: 26.29 KG/M2 | HEIGHT: 64 IN | WEIGHT: 154 LBS | SYSTOLIC BLOOD PRESSURE: 133 MMHG | OXYGEN SATURATION: 99 %

## 2024-10-08 DIAGNOSIS — E21.3 HYPERPARATHYROIDISM, UNSPECIFIED: ICD-10-CM

## 2024-10-08 PROCEDURE — 99024 POSTOP FOLLOW-UP VISIT: CPT

## 2024-11-05 ENCOUNTER — APPOINTMENT (OUTPATIENT)
Dept: ORTHOPEDIC SURGERY | Facility: CLINIC | Age: 79
End: 2024-11-05
Payer: MEDICARE

## 2024-11-05 VITALS — WEIGHT: 154 LBS | HEIGHT: 64 IN | BODY MASS INDEX: 26.29 KG/M2

## 2024-11-05 DIAGNOSIS — M19.011 PRIMARY OSTEOARTHRITIS, RIGHT SHOULDER: ICD-10-CM

## 2024-11-05 PROCEDURE — 99213 OFFICE O/P EST LOW 20 MIN: CPT

## 2024-11-07 ENCOUNTER — APPOINTMENT (OUTPATIENT)
Dept: ORTHOPEDIC SURGERY | Facility: CLINIC | Age: 79
End: 2024-11-07
Payer: MEDICARE

## 2024-11-07 VITALS — HEIGHT: 61 IN | WEIGHT: 154 LBS | BODY MASS INDEX: 29.07 KG/M2

## 2024-11-07 DIAGNOSIS — M81.0 AGE-RELATED OSTEOPOROSIS W/OUT CURRENT PATHOLOGICAL FRACTURE: ICD-10-CM

## 2024-11-07 DIAGNOSIS — M76.61 ACHILLES TENDINITIS, RIGHT LEG: ICD-10-CM

## 2024-11-07 PROBLEM — M19.011 PRIMARY OSTEOARTHRITIS OF RIGHT SHOULDER: Status: ACTIVE | Noted: 2024-11-07

## 2024-11-07 PROCEDURE — 99213 OFFICE O/P EST LOW 20 MIN: CPT

## 2025-01-16 ENCOUNTER — APPOINTMENT (OUTPATIENT)
Dept: ORTHOPEDIC SURGERY | Facility: CLINIC | Age: 80
End: 2025-01-16
Payer: MEDICARE

## 2025-01-16 VITALS — BODY MASS INDEX: 29.07 KG/M2 | WEIGHT: 154 LBS | HEIGHT: 61 IN

## 2025-01-16 DIAGNOSIS — M76.61 ACHILLES TENDINITIS, RIGHT LEG: ICD-10-CM

## 2025-01-16 DIAGNOSIS — E21.3 HYPERPARATHYROIDISM, UNSPECIFIED: ICD-10-CM

## 2025-01-16 PROCEDURE — 99213 OFFICE O/P EST LOW 20 MIN: CPT

## 2025-02-27 ENCOUNTER — RX RENEWAL (OUTPATIENT)
Age: 80
End: 2025-02-27

## 2025-03-10 ENCOUNTER — OUTPATIENT (OUTPATIENT)
Dept: OUTPATIENT SERVICES | Facility: HOSPITAL | Age: 80
LOS: 1 days | End: 2025-03-10
Payer: MEDICARE

## 2025-03-10 ENCOUNTER — APPOINTMENT (OUTPATIENT)
Dept: NUCLEAR MEDICINE | Facility: IMAGING CENTER | Age: 80
End: 2025-03-10
Payer: MEDICARE

## 2025-03-10 DIAGNOSIS — Z98.41 CATARACT EXTRACTION STATUS, RIGHT EYE: Chronic | ICD-10-CM

## 2025-03-10 DIAGNOSIS — E21.0 PRIMARY HYPERPARATHYROIDISM: ICD-10-CM

## 2025-03-10 DIAGNOSIS — Z98.83 FILTERING (VITREOUS) BLEB AFTER GLAUCOMA SURGERY STATUS: Chronic | ICD-10-CM

## 2025-03-10 DIAGNOSIS — Z87.19 PERSONAL HISTORY OF OTHER DISEASES OF THE DIGESTIVE SYSTEM: Chronic | ICD-10-CM

## 2025-03-10 DIAGNOSIS — Z98.890 OTHER SPECIFIED POSTPROCEDURAL STATES: Chronic | ICD-10-CM

## 2025-03-10 PROCEDURE — 78072 PARATHYRD PLANAR W/SPECT&CT: CPT | Mod: 26

## 2025-03-10 PROCEDURE — 78072 PARATHYRD PLANAR W/SPECT&CT: CPT

## 2025-03-10 PROCEDURE — A9500: CPT

## 2025-03-10 PROCEDURE — A9512: CPT

## 2025-03-12 ENCOUNTER — APPOINTMENT (OUTPATIENT)
Dept: PULMONOLOGY | Facility: CLINIC | Age: 80
End: 2025-03-12
Payer: MEDICARE

## 2025-03-12 ENCOUNTER — NON-APPOINTMENT (OUTPATIENT)
Age: 80
End: 2025-03-12

## 2025-03-12 VITALS
OXYGEN SATURATION: 97 % | DIASTOLIC BLOOD PRESSURE: 80 MMHG | BODY MASS INDEX: 29.27 KG/M2 | WEIGHT: 155 LBS | SYSTOLIC BLOOD PRESSURE: 128 MMHG | HEART RATE: 83 BPM | HEIGHT: 61 IN | TEMPERATURE: 97.9 F

## 2025-03-12 DIAGNOSIS — J45.20 MILD INTERMITTENT ASTHMA, UNCOMPLICATED: ICD-10-CM

## 2025-03-12 DIAGNOSIS — Z82.69 FAMILY HISTORY OF OTHER DISEASES OF THE MUSCULOSKELETAL SYSTEM AND CONNECTIVE TISSUE: ICD-10-CM

## 2025-03-12 DIAGNOSIS — Z82.49 FAMILY HISTORY OF ISCHEMIC HEART DISEASE AND OTHER DISEASES OF THE CIRCULATORY SYSTEM: ICD-10-CM

## 2025-03-12 DIAGNOSIS — R06.83 SNORING: ICD-10-CM

## 2025-03-12 DIAGNOSIS — R52 PAIN, UNSPECIFIED: ICD-10-CM

## 2025-03-12 DIAGNOSIS — Z86.19 PERSONAL HISTORY OF OTHER INFECTIOUS AND PARASITIC DISEASES: ICD-10-CM

## 2025-03-12 DIAGNOSIS — Z83.49 FAMILY HISTORY OF OTHER ENDOCRINE, NUTRITIONAL AND METABOLIC DISEASES: ICD-10-CM

## 2025-03-12 DIAGNOSIS — Z83.79 FAMILY HISTORY OF OTHER DISEASES OF THE DIGESTIVE SYSTEM: ICD-10-CM

## 2025-03-12 DIAGNOSIS — R06.02 SHORTNESS OF BREATH: ICD-10-CM

## 2025-03-12 DIAGNOSIS — Z83.438 FAMILY HISTORY OF OTHER DISORDER OF LIPOPROTEIN METABOLISM AND OTHER LIPIDEMIA: ICD-10-CM

## 2025-03-12 DIAGNOSIS — Z82.62 FAMILY HISTORY OF OSTEOPOROSIS: ICD-10-CM

## 2025-03-12 DIAGNOSIS — Z80.8 FAMILY HISTORY OF MALIGNANT NEOPLASM OF OTHER ORGANS OR SYSTEMS: ICD-10-CM

## 2025-03-12 DIAGNOSIS — B26.9 MUMPS W/OUT COMPLICATION: ICD-10-CM

## 2025-03-12 DIAGNOSIS — R91.8 OTHER NONSPECIFIC ABNORMAL FINDING OF LUNG FIELD: ICD-10-CM

## 2025-03-12 PROCEDURE — 94799 UNLISTED PULMONARY SVC/PX: CPT

## 2025-03-12 PROCEDURE — ZZZZZ: CPT

## 2025-03-12 PROCEDURE — 94060 EVALUATION OF WHEEZING: CPT

## 2025-03-12 PROCEDURE — 95012 NITRIC OXIDE EXP GAS DETER: CPT

## 2025-03-12 PROCEDURE — 94727 GAS DIL/WSHOT DETER LNG VOL: CPT

## 2025-03-12 PROCEDURE — 94729 DIFFUSING CAPACITY: CPT

## 2025-03-12 PROCEDURE — 71046 X-RAY EXAM CHEST 2 VIEWS: CPT

## 2025-03-12 PROCEDURE — 94618 PULMONARY STRESS TESTING: CPT

## 2025-03-12 PROCEDURE — 99204 OFFICE O/P NEW MOD 45 MIN: CPT | Mod: 25

## 2025-03-12 RX ORDER — FLUTICASONE FUROATE AND VILANTEROL TRIFENATATE 200; 25 UG/1; UG/1
200-25 POWDER RESPIRATORY (INHALATION)
Qty: 3 | Refills: 1 | Status: ACTIVE | COMMUNITY
Start: 2025-03-12 | End: 1900-01-01

## 2025-03-14 DIAGNOSIS — E21.3 HYPERPARATHYROIDISM, UNSPECIFIED: ICD-10-CM

## 2025-03-14 LAB
CCP AB SER IA-ACNC: <8 U/ML
ENA JO1 AB SER IA-ACNC: <0.2 AL
ENA RNP AB SER IA-ACNC: 0.6 AL
ENA RNP AB SER IA-ACNC: 0.6 AL
ENA SCL70 IGG SER IA-ACNC: <0.2 AL
ENA SM AB SER IA-ACNC: <0.2 AL
ENA SS-A AB SER IA-ACNC: <0.2 AL
ENA SS-B AB SER IA-ACNC: <0.2 AL
ERYTHROCYTE [SEDIMENTATION RATE] IN BLOOD BY WESTERGREN METHOD: 10 MM/HR
RF+CCP IGG SER-IMP: NEGATIVE
RHEUMATOID FACT SER QL: 30 IU/ML

## 2025-03-16 LAB
ANCA AB SER-IMP: ABNORMAL
C-ANCA SER-ACNC: NEGATIVE
MITOCHONDRIA AB SER IF-ACNC: NORMAL
P-ANCA TITR SER IF: NEGATIVE

## 2025-03-17 LAB
ANA PAT FLD IF-IMP: ABNORMAL
ANA SER IF-ACNC: ABNORMAL

## 2025-03-18 ENCOUNTER — TRANSCRIPTION ENCOUNTER (OUTPATIENT)
Age: 80
End: 2025-03-18

## 2025-03-18 LAB
ACE BLD-CCNC: 57 U/L
ALTERN TENCAPG(M6): <2 MCG/ML
ASPER FUMCAPG(M3): 14.1 MCG/ML
AUREOBASCAPG(M12): 2.1 MCG/ML
LACEYELLA SACCHARI IGG: 5.9 MCG/ML
M TB IFN-G BLD-IMP: NEGATIVE
MICROPOLYCAPG(M22): <2 MCG/ML
PENIC CHRYCAPG(M1): 13.8 MCG/ML
PHOMA BETAE IGG: <2 MCG/ML
QUANTIFERON TB PLUS MITOGEN MINUS NIL: 9.75 IU/ML
QUANTIFERON TB PLUS NIL: 0.02 IU/ML
QUANTIFERON TB PLUS TB1 MINUS NIL: 0 IU/ML
QUANTIFERON TB PLUS TB2 MINUS NIL: 0 IU/ML
TRICHODERMA VIRIDE IGG: 2.4 MCG/ML

## 2025-03-18 RX ORDER — OLOPATADINE HYDROCHLORIDE 665 UG/1
0.6 SPRAY, METERED NASAL
Qty: 1 | Refills: 3 | Status: ACTIVE | COMMUNITY
Start: 2025-03-12 | End: 1900-01-01

## 2025-03-21 ENCOUNTER — APPOINTMENT (OUTPATIENT)
Dept: CT IMAGING | Facility: CLINIC | Age: 80
End: 2025-03-21

## 2025-03-21 ENCOUNTER — OUTPATIENT (OUTPATIENT)
Dept: OUTPATIENT SERVICES | Facility: HOSPITAL | Age: 80
LOS: 1 days | End: 2025-03-21
Payer: MEDICARE

## 2025-03-21 DIAGNOSIS — Z98.83 FILTERING (VITREOUS) BLEB AFTER GLAUCOMA SURGERY STATUS: Chronic | ICD-10-CM

## 2025-03-21 DIAGNOSIS — Z98.41 CATARACT EXTRACTION STATUS, RIGHT EYE: Chronic | ICD-10-CM

## 2025-03-21 DIAGNOSIS — R91.8 OTHER NONSPECIFIC ABNORMAL FINDING OF LUNG FIELD: ICD-10-CM

## 2025-03-21 DIAGNOSIS — Z98.890 OTHER SPECIFIED POSTPROCEDURAL STATES: Chronic | ICD-10-CM

## 2025-03-21 DIAGNOSIS — Z87.19 PERSONAL HISTORY OF OTHER DISEASES OF THE DIGESTIVE SYSTEM: Chronic | ICD-10-CM

## 2025-03-21 PROCEDURE — 71250 CT THORAX DX C-: CPT

## 2025-03-21 PROCEDURE — 71250 CT THORAX DX C-: CPT | Mod: 26

## 2025-03-26 ENCOUNTER — APPOINTMENT (OUTPATIENT)
Dept: INTERNAL MEDICINE | Facility: CLINIC | Age: 80
End: 2025-03-26
Payer: MEDICARE

## 2025-03-26 ENCOUNTER — OUTPATIENT (OUTPATIENT)
Dept: OUTPATIENT SERVICES | Facility: HOSPITAL | Age: 80
LOS: 1 days | End: 2025-03-26
Payer: MEDICARE

## 2025-03-26 VITALS
BODY MASS INDEX: 29.27 KG/M2 | SYSTOLIC BLOOD PRESSURE: 120 MMHG | HEIGHT: 61 IN | DIASTOLIC BLOOD PRESSURE: 80 MMHG | OXYGEN SATURATION: 97 % | WEIGHT: 155 LBS | HEART RATE: 68 BPM

## 2025-03-26 DIAGNOSIS — Z98.41 CATARACT EXTRACTION STATUS, RIGHT EYE: Chronic | ICD-10-CM

## 2025-03-26 DIAGNOSIS — I10 ESSENTIAL (PRIMARY) HYPERTENSION: ICD-10-CM

## 2025-03-26 DIAGNOSIS — Z87.19 PERSONAL HISTORY OF OTHER DISEASES OF THE DIGESTIVE SYSTEM: Chronic | ICD-10-CM

## 2025-03-26 DIAGNOSIS — R21 RASH AND OTHER NONSPECIFIC SKIN ERUPTION: ICD-10-CM

## 2025-03-26 DIAGNOSIS — Z98.83 FILTERING (VITREOUS) BLEB AFTER GLAUCOMA SURGERY STATUS: Chronic | ICD-10-CM

## 2025-03-26 DIAGNOSIS — Z98.890 OTHER SPECIFIED POSTPROCEDURAL STATES: Chronic | ICD-10-CM

## 2025-03-26 PROCEDURE — 99215 OFFICE O/P EST HI 40 MIN: CPT

## 2025-03-26 PROCEDURE — G0463: CPT

## 2025-03-26 PROCEDURE — G2211 COMPLEX E/M VISIT ADD ON: CPT

## 2025-03-29 PROBLEM — M33.13 DERMATOMYOSITIS: Status: ACTIVE | Noted: 2025-03-29

## 2025-03-29 PROBLEM — R21 RASH: Status: ACTIVE | Noted: 2025-03-26

## 2025-04-01 ENCOUNTER — APPOINTMENT (OUTPATIENT)
Dept: DERMATOLOGY | Facility: CLINIC | Age: 80
End: 2025-04-01
Payer: MEDICARE

## 2025-04-01 DIAGNOSIS — Z79.899 OTHER LONG TERM (CURRENT) DRUG THERAPY: ICD-10-CM

## 2025-04-01 DIAGNOSIS — M33.13 OTHER DERMATOMYOSITIS WITHOUT MYOPATHY: ICD-10-CM

## 2025-04-01 PROCEDURE — 99204 OFFICE O/P NEW MOD 45 MIN: CPT

## 2025-04-01 RX ORDER — MYCOPHENOLATE MOFETIL 500 MG/1
500 TABLET ORAL
Qty: 90 | Refills: 0 | Status: ACTIVE | COMMUNITY
Start: 2025-04-01 | End: 1900-01-01

## 2025-04-04 LAB
ALBUMIN SERPL ELPH-MCNC: 4.5 G/DL
ALDOLASE SERPL-CCNC: 4.6 U/L
ALP BLD-CCNC: 56 U/L
ALT SERPL-CCNC: 24 U/L
ANION GAP SERPL CALC-SCNC: 16 MMOL/L
APPEARANCE: CLEAR
AST SERPL-CCNC: 27 U/L
BACTERIA: NEGATIVE /HPF
BASOPHILS # BLD AUTO: 0.08 K/UL
BASOPHILS NFR BLD AUTO: 1 %
BILIRUB SERPL-MCNC: 0.7 MG/DL
BILIRUBIN URINE: NEGATIVE
BLOOD URINE: NEGATIVE
BUN SERPL-MCNC: 15 MG/DL
CALCIUM SERPL-MCNC: 10.4 MG/DL
CAST: 0 /LPF
CHLORIDE SERPL-SCNC: 101 MMOL/L
CK BB SERPL ELPH-CCNC: 0 % (ref 0–?)
CK MB CFR SERPL ELPH: 0 %
CK MM SERPL ELPH-CCNC: 100 %
CO2 SERPL-SCNC: 26 MMOL/L
COLOR: YELLOW
CREAT SERPL-MCNC: 1.04 MG/DL
CREAT SPEC-SCNC: 42 MG/DL
CREATINE KINASE,TOTAL,SERUM: 92 U/L
CRP SERPL-MCNC: <3 MG/L
DSDNA AB SER-ACNC: <1 IU/ML
EGFRCR SERPLBLD CKD-EPI 2021: 55 ML/MIN/1.73M2
EOSINOPHIL # BLD AUTO: 0.13 K/UL
EOSINOPHIL NFR BLD AUTO: 1.6 %
EPITHELIAL CELLS: 0 /HPF
ERYTHROCYTE [SEDIMENTATION RATE] IN BLOOD BY WESTERGREN METHOD: 22 MM/HR
GLUCOSE QUALITATIVE U: NEGATIVE MG/DL
GLUCOSE SERPL-MCNC: 97 MG/DL
HBV CORE IGG+IGM SER QL: NONREACTIVE
HBV SURFACE AB SER QL: NONREACTIVE
HBV SURFACE AG SER QL: NONREACTIVE
HCT VFR BLD CALC: 44 %
HCV AB SER QL: NONREACTIVE
HCV S/CO RATIO: 0.25 S/CO
HEPATITIS A IGG ANTIBODY: REACTIVE
HEPB DNA PCR INT: NOT DETECTED
HEPB DNA PCR LOG: NOT DETECTED LOGIU/ML
HGB BLD-MCNC: 14.3 G/DL
IMM GRANULOCYTES NFR BLD AUTO: 0.1 %
KETONES URINE: NEGATIVE MG/DL
LEUKOCYTE ESTERASE URINE: NEGATIVE
LYMPHOCYTES # BLD AUTO: 1.59 K/UL
LYMPHOCYTES NFR BLD AUTO: 19.9 %
MACRO TYPE 1: 0 %
MACRO TYPE 2: 0 %
MAN DIFF?: NORMAL
MCHC RBC-ENTMCNC: 30.6 PG
MCHC RBC-ENTMCNC: 32.5 G/DL
MCV RBC AUTO: 94 FL
MICROALBUMIN 24H UR DL<=1MG/L-MCNC: <1.2 MG/DL
MICROALBUMIN/CREAT 24H UR-RTO: NORMAL MG/G
MICROSCOPIC-UA: NORMAL
MONOCYTES # BLD AUTO: 0.68 K/UL
MONOCYTES NFR BLD AUTO: 8.5 %
NEUTROPHILS # BLD AUTO: 5.49 K/UL
NEUTROPHILS NFR BLD AUTO: 68.9 %
NITRITE URINE: NEGATIVE
PH URINE: 6
PLATELET # BLD AUTO: NORMAL K/UL
POTASSIUM SERPL-SCNC: 4 MMOL/L
PROT SERPL-MCNC: 7.7 G/DL
PROTEIN URINE: NEGATIVE MG/DL
RBC # BLD: 4.68 M/UL
RBC # FLD: 14.2 %
RED BLOOD CELLS URINE: 1 /HPF
SODIUM SERPL-SCNC: 143 MMOL/L
SPECIFIC GRAVITY URINE: 1.01
T4 FREE SERPL-MCNC: 1.1 NG/DL
TSH SERPL-ACNC: 2.06 UIU/ML
UROBILINOGEN URINE: 0.2 MG/DL
WBC # FLD AUTO: 7.98 K/UL
WHITE BLOOD CELLS URINE: 1 /HPF

## 2025-04-10 DIAGNOSIS — M33.13 OTHER DERMATOMYOSITIS WITHOUT MYOPATHY: ICD-10-CM

## 2025-04-15 ENCOUNTER — TRANSCRIPTION ENCOUNTER (OUTPATIENT)
Age: 80
End: 2025-04-15

## 2025-04-29 ENCOUNTER — APPOINTMENT (OUTPATIENT)
Dept: INTERNAL MEDICINE | Facility: CLINIC | Age: 80
End: 2025-04-29

## 2025-04-30 ENCOUNTER — OUTPATIENT (OUTPATIENT)
Dept: OUTPATIENT SERVICES | Facility: HOSPITAL | Age: 80
LOS: 1 days | End: 2025-04-30
Payer: MEDICARE

## 2025-04-30 ENCOUNTER — APPOINTMENT (OUTPATIENT)
Dept: INTERNAL MEDICINE | Facility: CLINIC | Age: 80
End: 2025-04-30

## 2025-04-30 VITALS
HEART RATE: 68 BPM | WEIGHT: 154 LBS | SYSTOLIC BLOOD PRESSURE: 128 MMHG | BODY MASS INDEX: 29.07 KG/M2 | OXYGEN SATURATION: 97 % | HEIGHT: 61 IN | DIASTOLIC BLOOD PRESSURE: 68 MMHG

## 2025-04-30 DIAGNOSIS — E78.5 HYPERLIPIDEMIA, UNSPECIFIED: ICD-10-CM

## 2025-04-30 DIAGNOSIS — Z98.83 FILTERING (VITREOUS) BLEB AFTER GLAUCOMA SURGERY STATUS: Chronic | ICD-10-CM

## 2025-04-30 DIAGNOSIS — M33.13 OTHER DERMATOMYOSITIS WITHOUT MYOPATHY: ICD-10-CM

## 2025-04-30 DIAGNOSIS — I10 ESSENTIAL (PRIMARY) HYPERTENSION: ICD-10-CM

## 2025-04-30 DIAGNOSIS — E21.0 PRIMARY HYPERPARATHYROIDISM: ICD-10-CM

## 2025-04-30 DIAGNOSIS — M81.0 AGE-RELATED OSTEOPOROSIS WITHOUT CURRENT PATHOLOGICAL FRACTURE: ICD-10-CM

## 2025-04-30 DIAGNOSIS — Z98.41 CATARACT EXTRACTION STATUS, RIGHT EYE: Chronic | ICD-10-CM

## 2025-04-30 DIAGNOSIS — Z98.890 OTHER SPECIFIED POSTPROCEDURAL STATES: Chronic | ICD-10-CM

## 2025-04-30 DIAGNOSIS — Z87.19 PERSONAL HISTORY OF OTHER DISEASES OF THE DIGESTIVE SYSTEM: Chronic | ICD-10-CM

## 2025-04-30 DIAGNOSIS — E21.3 HYPERPARATHYROIDISM, UNSPECIFIED: ICD-10-CM

## 2025-04-30 DIAGNOSIS — Z13.31 ENCOUNTER FOR SCREENING FOR DEPRESSION: ICD-10-CM

## 2025-04-30 DIAGNOSIS — Z00.00 ENCOUNTER FOR GENERAL ADULT MEDICAL EXAMINATION WITHOUT ABNORMAL FINDINGS: ICD-10-CM

## 2025-04-30 PROCEDURE — G0438: CPT

## 2025-04-30 PROCEDURE — G0444 DEPRESSION SCREEN ANNUAL: CPT | Mod: 59

## 2025-04-30 PROCEDURE — G0439: CPT

## 2025-05-01 ENCOUNTER — APPOINTMENT (OUTPATIENT)
Dept: INTERNAL MEDICINE | Facility: CLINIC | Age: 80
End: 2025-05-01

## 2025-05-13 ENCOUNTER — APPOINTMENT (OUTPATIENT)
Dept: DERMATOLOGY | Facility: CLINIC | Age: 80
End: 2025-05-13
Payer: MEDICARE

## 2025-05-13 VITALS — HEIGHT: 61 IN | BODY MASS INDEX: 29.07 KG/M2 | WEIGHT: 154 LBS

## 2025-05-13 DIAGNOSIS — Z79.899 OTHER LONG TERM (CURRENT) DRUG THERAPY: ICD-10-CM

## 2025-05-13 DIAGNOSIS — M33.13 OTHER DERMATOMYOSITIS WITHOUT MYOPATHY: ICD-10-CM

## 2025-05-13 PROCEDURE — 99214 OFFICE O/P EST MOD 30 MIN: CPT

## 2025-05-13 PROCEDURE — G2211 COMPLEX E/M VISIT ADD ON: CPT

## 2025-05-13 RX ORDER — TRIAMCINOLONE ACETONIDE 1 MG/G
0.1 OINTMENT TOPICAL
Qty: 1 | Refills: 1 | Status: ACTIVE | COMMUNITY
Start: 2025-05-13 | End: 1900-01-01

## 2025-05-20 LAB
ALBUMIN SERPL ELPH-MCNC: 4.4 G/DL
ALP BLD-CCNC: 48 U/L
ALT SERPL-CCNC: 31 U/L
ANION GAP SERPL CALC-SCNC: 14 MMOL/L
AST SERPL-CCNC: 26 U/L
BASOPHILS # BLD AUTO: 0.06 K/UL
BASOPHILS NFR BLD AUTO: 0.9 %
BILIRUB SERPL-MCNC: 0.4 MG/DL
BUN SERPL-MCNC: 16 MG/DL
CALCIUM SERPL-MCNC: 10.3 MG/DL
CHLORIDE SERPL-SCNC: 104 MMOL/L
CO2 SERPL-SCNC: 24 MMOL/L
CREAT SERPL-MCNC: 0.91 MG/DL
EGFRCR SERPLBLD CKD-EPI 2021: 65 ML/MIN/1.73M2
EOSINOPHIL # BLD AUTO: 0.23 K/UL
EOSINOPHIL NFR BLD AUTO: 3.4 %
GLUCOSE SERPL-MCNC: 93 MG/DL
HCT VFR BLD CALC: 42.7 %
HGB BLD-MCNC: 13.4 G/DL
IMM GRANULOCYTES NFR BLD AUTO: 0.3 %
LYMPHOCYTES # BLD AUTO: 1.04 K/UL
LYMPHOCYTES NFR BLD AUTO: 15.3 %
MAN DIFF?: NORMAL
MCHC RBC-ENTMCNC: 29.9 PG
MCHC RBC-ENTMCNC: 31.4 G/DL
MCV RBC AUTO: 95.3 FL
MONOCYTES # BLD AUTO: 0.58 K/UL
MONOCYTES NFR BLD AUTO: 8.6 %
NEUTROPHILS # BLD AUTO: 4.85 K/UL
NEUTROPHILS NFR BLD AUTO: 71.5 %
PLATELET # BLD AUTO: 289 K/UL
POTASSIUM SERPL-SCNC: 4.7 MMOL/L
PROT SERPL-MCNC: 7.1 G/DL
RBC # BLD: 4.48 M/UL
RBC # FLD: 14.4 %
SODIUM SERPL-SCNC: 142 MMOL/L
WBC # FLD AUTO: 6.78 K/UL

## 2025-06-17 ENCOUNTER — APPOINTMENT (OUTPATIENT)
Dept: DERMATOLOGY | Facility: CLINIC | Age: 80
End: 2025-06-17
Payer: MEDICARE

## 2025-06-17 PROCEDURE — G2211 COMPLEX E/M VISIT ADD ON: CPT

## 2025-06-17 PROCEDURE — 99214 OFFICE O/P EST MOD 30 MIN: CPT

## 2025-07-10 ENCOUNTER — APPOINTMENT (OUTPATIENT)
Dept: PULMONOLOGY | Facility: CLINIC | Age: 80
End: 2025-07-10
Payer: MEDICARE

## 2025-07-10 VITALS
HEIGHT: 61 IN | HEART RATE: 65 BPM | WEIGHT: 155 LBS | SYSTOLIC BLOOD PRESSURE: 125 MMHG | RESPIRATION RATE: 16 BRPM | BODY MASS INDEX: 29.27 KG/M2 | TEMPERATURE: 97.34 F | OXYGEN SATURATION: 95 % | DIASTOLIC BLOOD PRESSURE: 72 MMHG

## 2025-07-10 PROCEDURE — 99214 OFFICE O/P EST MOD 30 MIN: CPT | Mod: 25

## 2025-08-12 ENCOUNTER — APPOINTMENT (OUTPATIENT)
Dept: DERMATOLOGY | Facility: CLINIC | Age: 80
End: 2025-08-12
Payer: MEDICARE

## 2025-08-12 DIAGNOSIS — Z79.899 OTHER LONG TERM (CURRENT) DRUG THERAPY: ICD-10-CM

## 2025-08-12 DIAGNOSIS — M33.13 OTHER DERMATOMYOSITIS WITHOUT MYOPATHY: ICD-10-CM

## 2025-08-12 PROCEDURE — G2211 COMPLEX E/M VISIT ADD ON: CPT

## 2025-08-12 PROCEDURE — 99214 OFFICE O/P EST MOD 30 MIN: CPT

## 2025-08-20 ENCOUNTER — NON-APPOINTMENT (OUTPATIENT)
Age: 80
End: 2025-08-20

## 2025-08-21 LAB
ALBUMIN SERPL ELPH-MCNC: 4.4 G/DL
ALP BLD-CCNC: 51 U/L
ALT SERPL-CCNC: 24 U/L
ANION GAP SERPL CALC-SCNC: 13 MMOL/L
AST SERPL-CCNC: 25 U/L
BILIRUB SERPL-MCNC: 0.5 MG/DL
BUN SERPL-MCNC: 23 MG/DL
CALCIUM SERPL-MCNC: 10.2 MG/DL
CHLORIDE SERPL-SCNC: 102 MMOL/L
CO2 SERPL-SCNC: 27 MMOL/L
CREAT SERPL-MCNC: 1.05 MG/DL
EGFRCR SERPLBLD CKD-EPI 2021: 54 ML/MIN/1.73M2
GLUCOSE SERPL-MCNC: 111 MG/DL
POTASSIUM SERPL-SCNC: 4.8 MMOL/L
PROT SERPL-MCNC: 6.8 G/DL
SODIUM SERPL-SCNC: 141 MMOL/L

## 2025-08-28 ENCOUNTER — NON-APPOINTMENT (OUTPATIENT)
Age: 80
End: 2025-08-28

## (undated) DEVICE — SYR LUER LOK 5CC

## (undated) DEVICE — PACK HEAD & NECK

## (undated) DEVICE — POSITIONER FOAM EGG CRATE ULNAR 2PCS (PINK)

## (undated) DEVICE — DRAPE SPLIT SHEET 77" X 120"

## (undated) DEVICE — ELCTR BOVIE PENCIL SMOKE EVACUATION

## (undated) DEVICE — VISITEC 4X4

## (undated) DEVICE — DRSG STERISTRIPS 0.25 X 4"

## (undated) DEVICE — DRAPE TOWEL BLUE 17" X 24"

## (undated) DEVICE — SUT SILK 2-0 18" TIES

## (undated) DEVICE — GLV 7 PROTEXIS (WHITE)

## (undated) DEVICE — CANISTER DISPOSABLE THIN WALL 3000CC

## (undated) DEVICE — SUT VICRYL 2-0 27" SH UNDYED

## (undated) DEVICE — DRAPE 3/4 SHEET 52X76"

## (undated) DEVICE — DRAIN JACKSON PRATT 7FR ROUND END NO TROCAR

## (undated) DEVICE — DRSG STERISTRIPS 0.25 X 3"

## (undated) DEVICE — SUT VICRYL 4-0 27" RB-1 UNDYED

## (undated) DEVICE — STAPLER SKIN VISI-STAT 35 WIDE

## (undated) DEVICE — SUT SILK 3-0 18" TIES

## (undated) DEVICE — PREP BETADINE KIT

## (undated) DEVICE — SUT SILK 2-0 18" FS

## (undated) DEVICE — VENODYNE/SCD SLEEVE CALF MEDIUM

## (undated) DEVICE — DRAPE MAGNETIC INSTRUMENT MEDIUM

## (undated) DEVICE — DRSG BENZOIN 0.6CC

## (undated) DEVICE — DRAIN RESERVOIR FOR JACKSON PRATT 100CC CARDINAL

## (undated) DEVICE — LABELS BLANK W PEN

## (undated) DEVICE — LAP PAD W RING 18 X 18"

## (undated) DEVICE — ELCTR GROUNDING PAD ADULT COVIDIEN

## (undated) DEVICE — PROTECTOR HEEL / ELBOW FLUFFY

## (undated) DEVICE — SOL IRR POUR H2O 1500ML

## (undated) DEVICE — DRAIN JACKSON PRATT 7MM FLAT FULL NO TROCAR

## (undated) DEVICE — WARMING BLANKET FULL ADULT

## (undated) DEVICE — SUT ETHILON 6-0 18" PS-3